# Patient Record
Sex: MALE | Race: WHITE | NOT HISPANIC OR LATINO | Employment: FULL TIME | URBAN - METROPOLITAN AREA
[De-identification: names, ages, dates, MRNs, and addresses within clinical notes are randomized per-mention and may not be internally consistent; named-entity substitution may affect disease eponyms.]

---

## 2020-04-29 ENCOUNTER — OFFICE VISIT (OUTPATIENT)
Dept: FAMILY MEDICINE | Facility: CLINIC | Age: 30
End: 2020-04-29
Payer: MEDICAID

## 2020-04-29 VITALS
OXYGEN SATURATION: 97 % | TEMPERATURE: 98 F | HEIGHT: 73 IN | SYSTOLIC BLOOD PRESSURE: 118 MMHG | HEART RATE: 89 BPM | WEIGHT: 239 LBS | BODY MASS INDEX: 31.68 KG/M2 | DIASTOLIC BLOOD PRESSURE: 70 MMHG

## 2020-04-29 DIAGNOSIS — F43.20 ADJUSTMENT DISORDER, UNSPECIFIED TYPE: Primary | ICD-10-CM

## 2020-04-29 DIAGNOSIS — Z00.00 WELLNESS EXAMINATION: ICD-10-CM

## 2020-04-29 DIAGNOSIS — F41.8 DEPRESSION WITH ANXIETY: ICD-10-CM

## 2020-04-29 DIAGNOSIS — Z23 NEED FOR DIPHTHERIA-TETANUS-PERTUSSIS (TDAP) VACCINE: ICD-10-CM

## 2020-04-29 DIAGNOSIS — Z86.59 HISTORY OF SUICIDAL IDEATION: ICD-10-CM

## 2020-04-29 PROCEDURE — 99204 OFFICE O/P NEW MOD 45 MIN: CPT | Mod: S$PBB,,, | Performed by: NURSE PRACTITIONER

## 2020-04-29 PROCEDURE — 99205 OFFICE O/P NEW HI 60 MIN: CPT | Performed by: NURSE PRACTITIONER

## 2020-04-29 PROCEDURE — 99204 PR OFFICE/OUTPT VISIT, NEW, LEVL IV, 45-59 MIN: ICD-10-PCS | Mod: S$PBB,,, | Performed by: NURSE PRACTITIONER

## 2020-04-29 PROCEDURE — 90471 IMMUNIZATION ADMIN: CPT | Mod: PBBFAC | Performed by: NURSE PRACTITIONER

## 2020-04-29 NOTE — PROGRESS NOTES
SUBJECTIVE:    Patient ID: Adam Nettles is a 30 y.o. male.    Chief Complaint: Establish Care and autism    Mr. Nettles is a 30-year-old gentleman here today to establish as a new patient.  History significant for ADHD and panic attacks.  He is well-versed in mental health terminology.  He lives with his father and his sister.  Reports some adjustment problems since his sister has moved back in.    He is here today with concern and recommendation from another provider that he be evaluated for autism spectrum disorder.  He reports that he had panic attack while in the , for reason he does not wish to discuss today, and was sent to [facility], treated and evaluated for anxiety/panic.  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  Reports that this was the time of his 1st suicide attempt, 'found his father's 45 revolver and tried to use it, however he could not figure out how to load it'.  He has had several attempts.  He does report suicidal ideation few months ago.  No recent.    Denies any chest pain, palpitations or dyspnea.  Denies dyspepsia, cough, hemoptysis or melena.  His only physical complaint is some dental decay.          Past Medical History:   Diagnosis Date    ADHD (attention deficit hyperactivity disorder)     Panic attacks 2015     History reviewed. No pertinent surgical history.  Family History   Problem Relation Age of Onset    Pancreatic cancer Mother     Cancer Mother     Stroke Father     No Known Problems Sister     No Known Problems Brother        Marital Status: Single  Alcohol History:  reports that he does not drink alcohol.  Tobacco History:  reports that he has never smoked. He has never used smokeless tobacco.  Drug History:  reports that he does not use drugs.    Review of patient's allergies indicates:  No Known  "Allergies  No current outpatient medications on file.    Review of Systems   HENT: Positive for dental problem.    Psychiatric/Behavioral: Positive for self-injury and suicidal ideas. The patient is nervous/anxious.    All other systems reviewed and are negative.         Objective:      Vitals:    04/29/20 1449   BP: 118/70   Pulse: 89   Temp: 97.9 °F (36.6 °C)   SpO2: 97%   Weight: 108.4 kg (239 lb)   Height: 6' 1" (1.854 m)     Body mass index is 31.53 kg/m².  Physical Exam   Constitutional: He is oriented to person, place, and time. He appears well-developed and well-nourished.   Obese   HENT:   Head: Normocephalic.   Eyes: Pupils are equal, round, and reactive to light. Conjunctivae and EOM are normal.   Neck: Normal range of motion. Neck supple. No thyromegaly present.   Cardiovascular: Normal rate, regular rhythm and normal heart sounds.   Pulmonary/Chest: Effort normal and breath sounds normal.   Abdominal: Soft. Bowel sounds are normal. He exhibits no distension.   Protuberant   Musculoskeletal: Normal range of motion.   Neurological: He is alert and oriented to person, place, and time.   Skin: Skin is warm and dry. Capillary refill takes less than 2 seconds.   Psychiatric: He has a normal mood and affect.   Nursing note and vitals reviewed.        Assessment:       1. Adjustment disorder, unspecified type    2. Depression with anxiety    3. Wellness examination    4. Need for diphtheria-tetanus-pertussis (Tdap) vaccine    5. History of suicidal ideation         Plan:       Adjustment disorder, unspecified type  -     Ambulatory referral/consult to Psychology; Future; Expected date: 05/06/2020    Depression with anxiety  -     Ambulatory referral/consult to Psychology; Future; Expected date: 05/06/2020    Wellness examination  -     CBC auto differential; Future; Expected date: 04/29/2020  -     Comprehensive metabolic panel; Future; Expected date: 04/29/2020  -     Lipid panel; Future; Expected date: " 04/29/2020  -     Urinalysis; Future; Expected date: 04/29/2020  -     TSH; Future; Expected date: 04/29/2020  -     T4, free; Future; Expected date: 04/29/2020  -     HIV 1/2 Ag/Ab (4th Gen); Future; Expected date: 04/29/2020    Need for diphtheria-tetanus-pertussis (Tdap) vaccine  -     Tdap Vaccine    History of suicidal ideation  -     Ambulatory referral/consult to Psychology; Future; Expected date: 05/06/2020      Follow up in about 2 months (around 6/29/2020), or if symptoms worsen or fail to improve.

## 2020-04-29 NOTE — PATIENT INSTRUCTIONS
Depression Affects Your Mind and Body  Everyone feels sad or blue from time to time for a few days or weeks. Depression is when these feelings don't go away and they interfere with daily life.  Depression is a real illness that can develop at any age. It is one of the most common mental health problems in the [place] Depression makes you feel sad, helpless, and hopeless. It gets in the way of your life and relationships. It inhibits your ability to think and act. But, with help, you can feel better again.     When I was depressed, I felt awful. I was so tired all the time I could hardly think, but at night I couldnt fall asleep. My head hurt. My stomach hurt. I didnt know what was wrong with me.   Depression affects your whole body  Brain chemicals affect your body as well as your mood. So depression may do more than just make you feel low. You may also feel bad physically. Depression can:  · Cause trouble with mental tasks such as remembering, concentrating, or making decisions  · Make you feel nervous and jumpy  · Cause trouble sleeping. Or you may sleep too much  · Change your appetite  · Cause headaches, stomachaches, or other aches and pains  · Drain your body of energy  Depression and other illness  It is common for people who have chronic health problems to also have depression. It can often be hard to tell which one caused the other. A person might become depressed after finding out they have a health problem. But some studies suggest being depressed may make certain health problems more likely. And some depressed people stop taking care of themselves. This may make them more likely to get sick.  Date Last Reviewed: 1/1/2017 © 2000-2017 [employer]. [address] [place] [address], [place] [address]. All rights reserved. This information is not intended as a substitute for professional medical care. Always follow your healthcare professional's instructions.        Understanding Anxiety  Disorders  Almost everyone gets nervous now and then. Its normal to have knots in your stomach before a test, or for your heart to race on a first date. But an anxiety disorder is much more than a case of nerves. In fact, its symptoms may be overwhelming. But treatment can relieve many of these symptoms. Talking to your healthcare provider is the first step.    What are anxiety disorders?  An anxiety disorder causes intense feelings of panic and fear. These feelings may arise for no apparent reason. And they tend to recur again and again. They may prevent you from coping with life and cause you great distress. As a result, you may avoid anything that triggers your fear. In extreme cases, you may never leave the house. Anxiety disorders may cause other symptoms, such as:  · Obsessive thoughts you cant control  · Constant nightmares or painful thoughts of the past  · Nausea, sweating, and muscle tension  · Trouble sleeping or concentrating  What causes anxiety disorders?  Anxiety disorders tend to run in families. For some people, childhood abuse or neglect may play a role. For others, stressful life events or trauma may trigger anxiety disorders. Anxiety can trigger low self-esteem and poor coping skills.  Common anxiety disorders  · Panic disorder. This causes an intense fear of being in danger.  · Phobias. These are extreme fears of certain objects, places, or events.  · Obsessive-compulsive disorder. This causes you to have unwanted thoughts and urges. You also may perform certain actions over and over.  · Posttraumatic stress disorder. This occurs in people who have survived a terrible ordeal. It can cause nightmares and flashbacks about the event.  · Generalized anxiety disorder. This causes constant worry that can greatly disrupt your life.   Getting better  You may believe that nothing can help you. Or, you might fear what others may think. But most anxiety symptoms can be eased. Having an anxiety disorder  is nothing to be ashamed of. Most people do best with treatment that combines medicine and therapy. These arent cures. But they can help you live a healthier life.  Date Last Reviewed: 2/1/2017  © 0971-8041 [employer]. [address] [place] [address], [place] [address]. All rights reserved. This information is not intended as a substitute for professional medical care. Always follow your healthcare professional's instructions.        Adjustment Disorder  Life changes -- work, family, parents, children -- each can cause a great deal of stress in life. An adjustment disorder means you have trouble dealing with this change and stress. This problem can have serious results. You may feel helpless, depressed, make bad decisions, or even feel like you want to hurt yourself.  Adjustment disorder can cause anxiety or depression. It is triggered by daily stresses such as:  · Death of a loved one  · Divorce  · Marriage  · General life changes such as changing or leaving a job  · Moving  · Illness or other health issue for you or a family  member  · Sex  · Money     Symptoms may include:  · Sadness, crying  · Anxiety  · Insomnia  · Poor concentration  · Trouble doing simple things  · New problems at work or with family or friends  · Loss of self-esteem  · Sense of hopelessness  · Feeling trapped or cut off from others  With this condition, it is common to feel sad, guilty, hopeless and restless. These feelings may continue for weeks or months. It can be helpful to identify what is causing the additional stress and take steps to get extra support. If new stressful events do not occur, it is likely that you will gradually start feeling better.  Home care  · If you have been given a prescription for medicine, take it as directed.  · It helps to talk about your feelings and thoughts with family or friends that understand and support you.  Follow-up care  Follow up with your healthcare provider, or therapist as advised. Let  them know if this condition does not improve or gets worse.  When to seek medical advice  Call your healthcare provider right away if any of these occur:  · Worsening depression or anxiety  · Feeling out of control  · Thoughts of harming yourself or another  · Being unable to care for yourself  Date Last Reviewed: 9/29/2015  © 2149-8998 [employer]. [address] [place] [address]. All rights reserved. This information is not intended as a substitute for professional medical care. Always follow your healthcare professional's instructions.      If you or someone you know is thinking about suicide, call the National Suicide Prevention Lifeline. 9-752-584-TALK (9952)

## 2020-05-02 ENCOUNTER — HOSPITAL ENCOUNTER (OUTPATIENT)
Facility: HOSPITAL | Age: 30
Discharge: HOME | End: 2020-05-04
Attending: EMERGENCY MEDICINE | Admitting: INTERNAL MEDICINE
Payer: MEDICAID

## 2020-05-02 DIAGNOSIS — R07.9 CHEST PAIN: ICD-10-CM

## 2020-05-02 DIAGNOSIS — R10.9 RIGHT FLANK PAIN: ICD-10-CM

## 2020-05-02 LAB
ALBUMIN SERPL BCP-MCNC: 4.3 G/DL (ref 3.5–5.2)
ALP SERPL-CCNC: 149 U/L (ref 55–135)
ALT SERPL W/O P-5'-P-CCNC: 78 U/L (ref 10–44)
AMPHET+METHAMPHET UR QL: NEGATIVE
ANION GAP SERPL CALC-SCNC: 9 MMOL/L (ref 8–16)
AST SERPL-CCNC: 51 U/L (ref 10–40)
BARBITURATES UR QL SCN>200 NG/ML: NEGATIVE
BASOPHILS # BLD AUTO: 0.05 K/UL (ref 0–0.2)
BASOPHILS NFR BLD: 0.4 % (ref 0–1.9)
BENZODIAZ UR QL SCN>200 NG/ML: NEGATIVE
BILIRUB SERPL-MCNC: 1.5 MG/DL (ref 0.1–1)
BILIRUB UR QL STRIP: NEGATIVE
BUN SERPL-MCNC: 12 MG/DL (ref 6–20)
BZE UR QL SCN: NEGATIVE
CALCIUM SERPL-MCNC: 9.1 MG/DL (ref 8.7–10.5)
CANNABINOIDS UR QL SCN: NEGATIVE
CHLORIDE SERPL-SCNC: 103 MMOL/L (ref 95–110)
CK SERPL-CCNC: 106 U/L (ref 20–200)
CLARITY UR: CLEAR
CO2 SERPL-SCNC: 24 MMOL/L (ref 23–29)
COLOR UR: YELLOW
CREAT SERPL-MCNC: 0.9 MG/DL (ref 0.5–1.4)
CREAT UR-MCNC: 362 MG/DL (ref 23–375)
CRP SERPL-MCNC: 6.44 MG/DL (ref 0–0.75)
DIFFERENTIAL METHOD: ABNORMAL
EOSINOPHIL # BLD AUTO: 0.2 K/UL (ref 0–0.5)
EOSINOPHIL NFR BLD: 2 % (ref 0–8)
ERYTHROCYTE [DISTWIDTH] IN BLOOD BY AUTOMATED COUNT: 12.9 % (ref 11.5–14.5)
EST. GFR  (AFRICAN AMERICAN): >60 ML/MIN/1.73 M^2
EST. GFR  (NON AFRICAN AMERICAN): >60 ML/MIN/1.73 M^2
GLUCOSE SERPL-MCNC: 98 MG/DL (ref 70–110)
GLUCOSE UR QL STRIP: NEGATIVE
HCT VFR BLD AUTO: 44.9 % (ref 40–54)
HGB BLD-MCNC: 14.9 G/DL (ref 14–18)
HGB UR QL STRIP: NEGATIVE
IMM GRANULOCYTES # BLD AUTO: 0.06 K/UL (ref 0–0.04)
IMM GRANULOCYTES NFR BLD AUTO: 0.5 % (ref 0–0.5)
KETONES UR QL STRIP: ABNORMAL
LACTATE SERPL-SCNC: 1 MMOL/L (ref 0.5–1.9)
LEUKOCYTE ESTERASE UR QL STRIP: NEGATIVE
LIPASE SERPL-CCNC: 21 U/L (ref 4–60)
LYMPHOCYTES # BLD AUTO: 3.4 K/UL (ref 1–4.8)
LYMPHOCYTES NFR BLD: 28.6 % (ref 18–48)
MCH RBC QN AUTO: 28.3 PG (ref 27–31)
MCHC RBC AUTO-ENTMCNC: 33.2 G/DL (ref 32–36)
MCV RBC AUTO: 85 FL (ref 82–98)
MONOCYTES # BLD AUTO: 1.5 K/UL (ref 0.3–1)
MONOCYTES NFR BLD: 12.5 % (ref 4–15)
NEUTROPHILS # BLD AUTO: 6.6 K/UL (ref 1.8–7.7)
NEUTROPHILS NFR BLD: 56 % (ref 38–73)
NITRITE UR QL STRIP: NEGATIVE
NRBC BLD-RTO: 0 /100 WBC
OPIATES UR QL SCN: NEGATIVE
PCP UR QL SCN>25 NG/ML: NEGATIVE
PH UR STRIP: 6 [PH] (ref 5–8)
PLATELET # BLD AUTO: 315 K/UL (ref 150–350)
PMV BLD AUTO: 10.4 FL (ref 9.2–12.9)
POTASSIUM SERPL-SCNC: 3.3 MMOL/L (ref 3.5–5.1)
PROCALCITONIN SERPL IA-MCNC: <0.05 NG/ML (ref 0–0.5)
PROT SERPL-MCNC: 8.4 G/DL (ref 6–8.4)
PROT UR QL STRIP: ABNORMAL
RBC # BLD AUTO: 5.27 M/UL (ref 4.6–6.2)
SARS-COV-2 RDRP RESP QL NAA+PROBE: NEGATIVE
SODIUM SERPL-SCNC: 136 MMOL/L (ref 136–145)
SP GR UR STRIP: 1.03 (ref 1–1.03)
TOXICOLOGY INFORMATION: NORMAL
URN SPEC COLLECT METH UR: ABNORMAL
UROBILINOGEN UR STRIP-ACNC: >=8 EU/DL
WBC # BLD AUTO: 11.75 K/UL (ref 3.9–12.7)

## 2020-05-02 PROCEDURE — 80053 COMPREHEN METABOLIC PANEL: CPT

## 2020-05-02 PROCEDURE — 96367 TX/PROPH/DG ADDL SEQ IV INF: CPT

## 2020-05-02 PROCEDURE — 99285 EMERGENCY DEPT VISIT HI MDM: CPT | Mod: 25

## 2020-05-02 PROCEDURE — 87040 BLOOD CULTURE FOR BACTERIA: CPT | Mod: 59

## 2020-05-02 PROCEDURE — 96365 THER/PROPH/DIAG IV INF INIT: CPT | Mod: 59

## 2020-05-02 PROCEDURE — 96375 TX/PRO/DX INJ NEW DRUG ADDON: CPT

## 2020-05-02 PROCEDURE — 96366 THER/PROPH/DIAG IV INF ADDON: CPT

## 2020-05-02 PROCEDURE — 83605 ASSAY OF LACTIC ACID: CPT

## 2020-05-02 PROCEDURE — 96361 HYDRATE IV INFUSION ADD-ON: CPT

## 2020-05-02 PROCEDURE — G0378 HOSPITAL OBSERVATION PER HR: HCPCS

## 2020-05-02 PROCEDURE — 25000003 PHARM REV CODE 250: Performed by: INTERNAL MEDICINE

## 2020-05-02 PROCEDURE — U0002 COVID-19 LAB TEST NON-CDC: HCPCS

## 2020-05-02 PROCEDURE — 36415 COLL VENOUS BLD VENIPUNCTURE: CPT

## 2020-05-02 PROCEDURE — 25500020 PHARM REV CODE 255: Performed by: EMERGENCY MEDICINE

## 2020-05-02 PROCEDURE — 86140 C-REACTIVE PROTEIN: CPT

## 2020-05-02 PROCEDURE — 84145 PROCALCITONIN (PCT): CPT

## 2020-05-02 PROCEDURE — 25000003 PHARM REV CODE 250: Performed by: NURSE PRACTITIONER

## 2020-05-02 PROCEDURE — 85025 COMPLETE CBC W/AUTO DIFF WBC: CPT

## 2020-05-02 PROCEDURE — 82550 ASSAY OF CK (CPK): CPT

## 2020-05-02 PROCEDURE — 63600175 PHARM REV CODE 636 W HCPCS: Performed by: INTERNAL MEDICINE

## 2020-05-02 PROCEDURE — 83690 ASSAY OF LIPASE: CPT

## 2020-05-02 PROCEDURE — 63600175 PHARM REV CODE 636 W HCPCS: Performed by: EMERGENCY MEDICINE

## 2020-05-02 PROCEDURE — 63600175 PHARM REV CODE 636 W HCPCS: Performed by: NURSE PRACTITIONER

## 2020-05-02 PROCEDURE — 80307 DRUG TEST PRSMV CHEM ANLYZR: CPT

## 2020-05-02 PROCEDURE — 81003 URINALYSIS AUTO W/O SCOPE: CPT | Mod: 59

## 2020-05-02 PROCEDURE — 96376 TX/PRO/DX INJ SAME DRUG ADON: CPT

## 2020-05-02 RX ORDER — POTASSIUM CHLORIDE 20 MEQ/1
40 TABLET, EXTENDED RELEASE ORAL
Status: COMPLETED | OUTPATIENT
Start: 2020-05-02 | End: 2020-05-02

## 2020-05-02 RX ORDER — SODIUM CHLORIDE 0.9 % (FLUSH) 0.9 %
3 SYRINGE (ML) INJECTION EVERY 6 HOURS PRN
Status: DISCONTINUED | OUTPATIENT
Start: 2020-05-02 | End: 2020-05-04 | Stop reason: HOSPADM

## 2020-05-02 RX ORDER — HYDROCODONE BITARTRATE AND ACETAMINOPHEN 5; 325 MG/1; MG/1
1 TABLET ORAL EVERY 6 HOURS PRN
Status: DISCONTINUED | OUTPATIENT
Start: 2020-05-02 | End: 2020-05-04 | Stop reason: HOSPADM

## 2020-05-02 RX ORDER — GLUCAGON 1 MG
1 KIT INJECTION
Status: DISCONTINUED | OUTPATIENT
Start: 2020-05-02 | End: 2020-05-04 | Stop reason: HOSPADM

## 2020-05-02 RX ORDER — IBUPROFEN 200 MG
16 TABLET ORAL
Status: DISCONTINUED | OUTPATIENT
Start: 2020-05-02 | End: 2020-05-04 | Stop reason: HOSPADM

## 2020-05-02 RX ORDER — TALC
9 POWDER (GRAM) TOPICAL NIGHTLY PRN
Status: DISCONTINUED | OUTPATIENT
Start: 2020-05-02 | End: 2020-05-04 | Stop reason: HOSPADM

## 2020-05-02 RX ORDER — VANCOMYCIN HCL IN 5 % DEXTROSE 1G/250ML
1000 PLASTIC BAG, INJECTION (ML) INTRAVENOUS ONCE
Status: DISCONTINUED | OUTPATIENT
Start: 2020-05-02 | End: 2020-05-02

## 2020-05-02 RX ORDER — ONDANSETRON 4 MG/1
8 TABLET, ORALLY DISINTEGRATING ORAL EVERY 8 HOURS PRN
Status: DISCONTINUED | OUTPATIENT
Start: 2020-05-02 | End: 2020-05-04 | Stop reason: HOSPADM

## 2020-05-02 RX ORDER — IBUPROFEN 200 MG
24 TABLET ORAL
Status: DISCONTINUED | OUTPATIENT
Start: 2020-05-02 | End: 2020-05-04 | Stop reason: HOSPADM

## 2020-05-02 RX ORDER — NAPROXEN 250 MG/1
500 TABLET ORAL ONCE
Status: COMPLETED | OUTPATIENT
Start: 2020-05-02 | End: 2020-05-02

## 2020-05-02 RX ORDER — KETOROLAC TROMETHAMINE 30 MG/ML
15 INJECTION, SOLUTION INTRAMUSCULAR; INTRAVENOUS
Status: COMPLETED | OUTPATIENT
Start: 2020-05-02 | End: 2020-05-02

## 2020-05-02 RX ORDER — ACETAMINOPHEN 325 MG/1
650 TABLET ORAL EVERY 4 HOURS PRN
Status: DISCONTINUED | OUTPATIENT
Start: 2020-05-02 | End: 2020-05-04 | Stop reason: HOSPADM

## 2020-05-02 RX ORDER — NAPROXEN 250 MG/1
500 TABLET ORAL
Status: DISCONTINUED | OUTPATIENT
Start: 2020-05-02 | End: 2020-05-02

## 2020-05-02 RX ORDER — POTASSIUM CHLORIDE 20 MEQ/1
40 TABLET, EXTENDED RELEASE ORAL ONCE
Status: DISCONTINUED | OUTPATIENT
Start: 2020-05-02 | End: 2020-05-04 | Stop reason: HOSPADM

## 2020-05-02 RX ADMIN — VANCOMYCIN HYDROCHLORIDE 2000 MG: 1 INJECTION, POWDER, LYOPHILIZED, FOR SOLUTION INTRAVENOUS at 06:05

## 2020-05-02 RX ADMIN — PIPERACILLIN AND TAZOBACTAM 3.38 G: 3; .375 INJECTION, POWDER, FOR SOLUTION INTRAVENOUS at 11:05

## 2020-05-02 RX ADMIN — IOHEXOL 100 ML: 350 INJECTION, SOLUTION INTRAVENOUS at 02:05

## 2020-05-02 RX ADMIN — PIPERACILLIN AND TAZOBACTAM 4.5 G: 4; .5 INJECTION, POWDER, LYOPHILIZED, FOR SOLUTION INTRAVENOUS; PARENTERAL at 01:05

## 2020-05-02 RX ADMIN — MELATONIN 9 MG: at 11:05

## 2020-05-02 RX ADMIN — SODIUM CHLORIDE 1000 ML: 0.9 INJECTION, SOLUTION INTRAVENOUS at 11:05

## 2020-05-02 RX ADMIN — KETOROLAC TROMETHAMINE 15 MG: 30 INJECTION, SOLUTION INTRAMUSCULAR; INTRAVENOUS at 11:05

## 2020-05-02 RX ADMIN — METHYLPREDNISOLONE SODIUM SUCCINATE 40 MG: 40 INJECTION, POWDER, FOR SOLUTION INTRAMUSCULAR; INTRAVENOUS at 06:05

## 2020-05-02 RX ADMIN — POTASSIUM CHLORIDE 40 MEQ: 20 TABLET, EXTENDED RELEASE ORAL at 12:05

## 2020-05-02 RX ADMIN — HYDROCODONE BITARTRATE AND ACETAMINOPHEN 1 TABLET: 5; 325 TABLET ORAL at 11:05

## 2020-05-02 RX ADMIN — Medication: at 09:05

## 2020-05-02 RX ADMIN — NAPROXEN 500 MG: 250 TABLET ORAL at 06:05

## 2020-05-02 NOTE — PROGRESS NOTES
VANCOMYCIN PHARMACOKINETIC NOTE:  Vancomycin Day # 1    Objective/Assessment:    Diagnosis/Indication for Vancomycin: Bacteremia     30 y.o., male; Actual Body Weight = 117.9 kg (259 lb 14.8 oz).    The patient has the following labs:  5/2/2020 Estimated Creatinine Clearance: 162.9 mL/min (based on SCr of 0.9 mg/dL). Lab Results   Component Value Date    BUN 12 05/02/2020       Lab Results   Component Value Date    WBC 11.75 05/02/2020              Plan:  Adjust vancomycin dose and/or frequency based on the patient's actual weight and renal function:  Initiate Vancomycin 2000 mg IV every q12 hours.  Orders have been entered into patient's chart.    Vancomycin dose = 16.9 mg/kg actual body weight    Vancomycin trough level has been ordered for prior to 4th dose.    Pharmacy will manage vancomycin therapy, monitor serum vancomycin levels, monitor renal function and adjust regimen as necessary.      Thank you for allowing us to participate in this patient's care.     Taiwo Zuniga 5/2/2020 5:54 PM  Department of Pharmacy  Ext [phone]

## 2020-05-02 NOTE — ED PROVIDER NOTES
Encounter Date: 5/2/2020       History     Chief Complaint   Patient presents with    Flank Pain     X 3 DAYS     Presents with complaint of right flank pain  Onset 3 days ago  Denies injury or picking up anything heavy.  Pt denies history of kidney stones.  Denies NV or urinary symptoms  Pt reports not taking any medication for the pain not even OTC's         Review of patient's allergies indicates:  No Known Allergies  Past Medical History:   Diagnosis Date    ADHD     ADHD (attention deficit hyperactivity disorder)     Panic attacks 2015     History reviewed. No pertinent surgical history.  Family History   Problem Relation Age of Onset    Pancreatic cancer Mother     Cancer Mother     Stroke Father     No Known Problems Sister     No Known Problems Brother      Social History     Tobacco Use    Smoking status: Never Smoker    Smokeless tobacco: Never Used   Substance Use Topics    Alcohol use: Never     Frequency: Never    Drug use: Never     Review of Systems   Constitutional: Negative for fever.   Respiratory: Negative for cough, shortness of breath and wheezing.    Cardiovascular: Negative for chest pain, palpitations and leg swelling.   Gastrointestinal: Negative for abdominal pain, diarrhea, nausea and vomiting.   Genitourinary: Positive for flank pain. Negative for decreased urine volume, difficulty urinating, dysuria, frequency, hematuria, penile pain, scrotal swelling, testicular pain and urgency.   Musculoskeletal: Negative for back pain.   Skin: Negative for rash.   Neurological: Negative for weakness.       Physical Exam     Initial Vitals [05/02/20 1037]   BP Pulse Resp Temp SpO2   133/77 (!) 116 18 98.5 °F (36.9 °C) 96 %      MAP       --         Physical Exam    Constitutional: He appears well-developed and well-nourished.   HENT:   Mouth/Throat: Oropharynx is clear and moist.   Eyes: Conjunctivae are normal.   Neck: Normal range of motion. Neck supple.   Cardiovascular: Normal rate.    Pulmonary/Chest: Breath sounds normal.   Abdominal: Soft. Bowel sounds are normal. There is tenderness. There is no rebound.   Suprapubic pain with palpation   Musculoskeletal: Normal range of motion.   Neurological: He is alert and oriented to person, place, and time. No sensory deficit. GCS score is 15. GCS eye subscore is 4. GCS verbal subscore is 5. GCS motor subscore is 6.   Skin: Skin is warm. Capillary refill takes less than 2 seconds.   Psychiatric: He has a normal mood and affect. Thought content normal.         ED Course   Procedures  Labs Reviewed   CBC W/ AUTO DIFFERENTIAL   COMPREHENSIVE METABOLIC PANEL   URINALYSIS, REFLEX TO URINE CULTURE          Imaging Results    None          Medical Decision Making:   Initial Assessment:   Right flank pain on and off for 3 days   Dr. Stone in to examine pt                                   Clinical Impression:     1. Right flank pain                      Gissell Payne NP  05/02/20 [id number]

## 2020-05-03 LAB
ALBUMIN SERPL BCP-MCNC: 4 G/DL (ref 3.5–5.2)
ALP SERPL-CCNC: 143 U/L (ref 55–135)
ALT SERPL W/O P-5'-P-CCNC: 84 U/L (ref 10–44)
ANION GAP SERPL CALC-SCNC: 8 MMOL/L (ref 8–16)
AST SERPL-CCNC: 56 U/L (ref 10–40)
BASOPHILS # BLD AUTO: 0.02 K/UL (ref 0–0.2)
BASOPHILS NFR BLD: 0.2 % (ref 0–1.9)
BILIRUB SERPL-MCNC: 1.1 MG/DL (ref 0.1–1)
BUN SERPL-MCNC: 14 MG/DL (ref 6–20)
CALCIUM SERPL-MCNC: 9.3 MG/DL (ref 8.7–10.5)
CHLORIDE SERPL-SCNC: 105 MMOL/L (ref 95–110)
CHOLEST SERPL-MCNC: 240 MG/DL (ref 120–199)
CHOLEST/HDLC SERPL: 5.5 {RATIO} (ref 2–5)
CO2 SERPL-SCNC: 23 MMOL/L (ref 23–29)
CREAT SERPL-MCNC: 0.9 MG/DL (ref 0.5–1.4)
DIFFERENTIAL METHOD: ABNORMAL
EOSINOPHIL # BLD AUTO: 0 K/UL (ref 0–0.5)
EOSINOPHIL NFR BLD: 0.1 % (ref 0–8)
ERYTHROCYTE [DISTWIDTH] IN BLOOD BY AUTOMATED COUNT: 12.8 % (ref 11.5–14.5)
ERYTHROCYTE [SEDIMENTATION RATE] IN BLOOD BY WESTERGREN METHOD: 23 MM/HR (ref 0–10)
EST. GFR  (AFRICAN AMERICAN): >60 ML/MIN/1.73 M^2
EST. GFR  (NON AFRICAN AMERICAN): >60 ML/MIN/1.73 M^2
ESTIMATED AVG GLUCOSE: 108 MG/DL (ref 68–131)
GLUCOSE SERPL-MCNC: 146 MG/DL (ref 70–110)
HBA1C MFR BLD HPLC: 5.4 % (ref 4.5–6.2)
HCT VFR BLD AUTO: 40.7 % (ref 40–54)
HDLC SERPL-MCNC: 44 MG/DL (ref 40–75)
HDLC SERPL: 18.3 % (ref 20–50)
HGB BLD-MCNC: 13.3 G/DL (ref 14–18)
IMM GRANULOCYTES # BLD AUTO: 0.07 K/UL (ref 0–0.04)
IMM GRANULOCYTES NFR BLD AUTO: 0.6 % (ref 0–0.5)
LDLC SERPL CALC-MCNC: 180.8 MG/DL (ref 63–159)
LYMPHOCYTES # BLD AUTO: 1.4 K/UL (ref 1–4.8)
LYMPHOCYTES NFR BLD: 11.7 % (ref 18–48)
MAGNESIUM SERPL-MCNC: 2.2 MG/DL (ref 1.6–2.6)
MCH RBC QN AUTO: 28.1 PG (ref 27–31)
MCHC RBC AUTO-ENTMCNC: 32.7 G/DL (ref 32–36)
MCV RBC AUTO: 86 FL (ref 82–98)
MONOCYTES # BLD AUTO: 0.7 K/UL (ref 0.3–1)
MONOCYTES NFR BLD: 5.6 % (ref 4–15)
NEUTROPHILS # BLD AUTO: 9.8 K/UL (ref 1.8–7.7)
NEUTROPHILS NFR BLD: 81.8 % (ref 38–73)
NONHDLC SERPL-MCNC: 196 MG/DL
NRBC BLD-RTO: 0 /100 WBC
PLATELET # BLD AUTO: 299 K/UL (ref 150–350)
PMV BLD AUTO: 11.2 FL (ref 9.2–12.9)
POTASSIUM SERPL-SCNC: 4.2 MMOL/L (ref 3.5–5.1)
PROT SERPL-MCNC: 7.7 G/DL (ref 6–8.4)
RBC # BLD AUTO: 4.73 M/UL (ref 4.6–6.2)
SODIUM SERPL-SCNC: 136 MMOL/L (ref 136–145)
TRIGL SERPL-MCNC: 76 MG/DL (ref 30–150)
WBC # BLD AUTO: 12 K/UL (ref 3.9–12.7)

## 2020-05-03 PROCEDURE — 80074 ACUTE HEPATITIS PANEL: CPT

## 2020-05-03 PROCEDURE — 83036 HEMOGLOBIN GLYCOSYLATED A1C: CPT

## 2020-05-03 PROCEDURE — 85025 COMPLETE CBC W/AUTO DIFF WBC: CPT

## 2020-05-03 PROCEDURE — 63600175 PHARM REV CODE 636 W HCPCS: Performed by: INTERNAL MEDICINE

## 2020-05-03 PROCEDURE — 83735 ASSAY OF MAGNESIUM: CPT

## 2020-05-03 PROCEDURE — 36415 COLL VENOUS BLD VENIPUNCTURE: CPT

## 2020-05-03 PROCEDURE — 96376 TX/PRO/DX INJ SAME DRUG ADON: CPT

## 2020-05-03 PROCEDURE — 96366 THER/PROPH/DIAG IV INF ADDON: CPT

## 2020-05-03 PROCEDURE — G0378 HOSPITAL OBSERVATION PER HR: HCPCS

## 2020-05-03 PROCEDURE — 25000003 PHARM REV CODE 250: Performed by: INTERNAL MEDICINE

## 2020-05-03 PROCEDURE — 80061 LIPID PANEL: CPT

## 2020-05-03 PROCEDURE — 85651 RBC SED RATE NONAUTOMATED: CPT

## 2020-05-03 PROCEDURE — 80053 COMPREHEN METABOLIC PANEL: CPT

## 2020-05-03 RX ORDER — NAPROXEN 250 MG/1
500 TABLET ORAL ONCE
Status: COMPLETED | OUTPATIENT
Start: 2020-05-03 | End: 2020-05-03

## 2020-05-03 RX ADMIN — METHYLPREDNISOLONE SODIUM SUCCINATE 40 MG: 40 INJECTION, POWDER, FOR SOLUTION INTRAMUSCULAR; INTRAVENOUS at 09:05

## 2020-05-03 RX ADMIN — NAPROXEN 500 MG: 250 TABLET ORAL at 09:05

## 2020-05-03 RX ADMIN — VANCOMYCIN HYDROCHLORIDE 2000 MG: 1 INJECTION, POWDER, LYOPHILIZED, FOR SOLUTION INTRAVENOUS at 06:05

## 2020-05-03 RX ADMIN — PIPERACILLIN AND TAZOBACTAM 3.38 G: 3; .375 INJECTION, POWDER, FOR SOLUTION INTRAVENOUS at 09:05

## 2020-05-03 RX ADMIN — PIPERACILLIN AND TAZOBACTAM 3.38 G: 3; .375 INJECTION, POWDER, FOR SOLUTION INTRAVENOUS at 10:05

## 2020-05-03 NOTE — NURSING
Pt states he only has pain with movement on his right side.  Pt states deep breathing also increases pain due to movement.  Pt states he has new rash to left wrist and right thumb, which is swollen and white in the center and red around it.  Message to doctor regarding rash.

## 2020-05-03 NOTE — PLAN OF CARE
Problem: Adult Inpatient Plan of Care  Goal: Plan of Care Review  Outcome: Ongoing, Progressing  Goal: Absence of Hospital-Acquired Illness or Injury  Outcome: Ongoing, Progressing  Goal: Optimal Comfort and Wellbeing  Outcome: Ongoing, Progressing

## 2020-05-03 NOTE — NURSING
Benadryl cream applied to new rash.  Pt instructed to call for worsening rash/itching.  Pt denies shortness of breath.  Pt voiced understanding.

## 2020-05-03 NOTE — PROGRESS NOTES
"[facility] Medicine Progress Note  Patient Name: Adam Nettles MRN: [id number]   Patient Class: OP- Observation  Length of Stay: 0   Admission Date: 5/2/2020 10:34 AM Attending Physician: Kumar Rodriguez MD   Primary Care Provider: Kylie Breaux NP Face-to-Face encounter date: 05/03/2020   Chief Complaint: Flank Pain (X 3 DAYS)    Assessment & Plan:   Adam Nettles is a 30 y.o. male admitted for    1. Right flank pain: significantly improved. Continue antibiotics. Awaiting final culture results. Continue Naproxen and steroids.       Discharge Planning:   anticipate discharge tomorrow    Subjective:    Interval History: Patient is doing fairly well. Pain has almost gone. No family present at bedside.No concerns/issues overnight reported by the patient or the nursing staff.    Review of Systems All other Review of Systems were found to be negative expect for that mentioned already in HPI.   Objective:   Physical Exam  BP (!) 96/57 (BP Location: Left arm, Patient Position: Lying)   Pulse 96   Temp 97.9 °F (36.6 °C) (Oral)   Resp 16   Ht 6' 1" (1.854 m)   Wt 117.9 kg (259 lb 14.8 oz)   SpO2 96%   BMI 34.29 kg/m²   Vitals reviewed.    Constitutional: No distress.   HENT: Atraumatic.   Cardiovascular: Normal rate, regular rhythm and normal heart sounds.   Pulmonary/Chest: Effort normal. Clear to auscultation bilaterally. No wheezes.   Abdominal: Soft. Bowel sounds are normal. Exhibits no distension and no mass. No tenderness  Neurological: Alert.   Skin: Skin is warm and dry.     Following labs were Reviewed   Recent Labs   Lab 05/03/20  0514   WBC 12.00   HGB 13.3*   HCT 40.7      CALCIUM 9.3   ALBUMIN 4.0   PROT 7.7      K 4.2   CO2 23      BUN 14   CREATININE 0.9   ALKPHOS 143*   ALT 84*   AST 56*   BILITOT 1.1*     No results found for: POCTGLUCOSE     All labs within the past 24 hours have been reviewed  Microbiology Results (last 7 days)     Procedure Component " Value Units Date/Time    Blood culture [[id number]] Collected:  05/02/20 1425    Order Status:  Completed Specimen:  Blood from Peripheral, Antecubital, Left Updated:  05/03/20 0117     Blood Culture, Routine No Growth to date    Blood culture [[id number]] Collected:  05/02/20 1440    Order Status:  Completed Specimen:  Blood from Peripheral, Antecubital, Left Updated:  05/03/20 0117     Blood Culture, Routine No Growth to date        CT Abdomen Pelvis With Contrast   Final Result      US Abdomen Limited   Final Result      Mildly echogenic liver may reflect hepatic steatosis or diffuse liver disease.         Electronically signed by: Watson Briggs MD   Date:    05/02/2020   Time:    13:04      CT Renal Stone Study ABD Pelvis WO   Final Result      1. No hydronephrosis, nephrolithiasis or other acute urinary system abnormality.   2. There is right pararenal fascial thickening, and fascial thickening and fat stranding in the right pericolic gutter.  The right kidney, ascending colon and appendix are all radiographically unremarkable.  Findings likely reflect an inflammatory reaction without identifiable source radiographically.         Electronically signed by: Watson Briggs MD   Date:    05/02/2020   Time:    11:46          Inpatient medications  Scheduled Meds:   piperacillin-tazobactam (ZOSYN) IVPB  3.375 g Intravenous Q8H    potassium chloride  40 mEq Oral Once    vancomycin (VANCOCIN) IVPB  2,000 mg Intravenous Q12H     Continuous Infusions:  PRN Meds:.acetaminophen, dextrose 50%, dextrose 50%, diphenhydrAMINE-zinc acetate 1-0.1%, glucagon (human recombinant), glucose, glucose, HYDROcodone-acetaminophen, melatonin, ondansetron, sodium chloride 0.9%, Pharmacy to dose Vancomycin consult **AND** vancomycin - pharmacy to dose    Above encounter included review of the medical records, interviewing and examining the patient face-to-face, discussion with family and other health care providers, ordering and  interpreting lab/test results and formulating a plan of care.     Medical Decision Making:    [] Low Complexity  [x] Moderate Complexity  [] High Complexity    Kumar Rodriguez  [facility] Hospitalist  05/03/2020

## 2020-05-04 VITALS
BODY MASS INDEX: 34.45 KG/M2 | RESPIRATION RATE: 18 BRPM | TEMPERATURE: 99 F | OXYGEN SATURATION: 96 % | HEIGHT: 73 IN | WEIGHT: 259.94 LBS | HEART RATE: 89 BPM | DIASTOLIC BLOOD PRESSURE: 73 MMHG | SYSTOLIC BLOOD PRESSURE: 122 MMHG

## 2020-05-04 LAB
ANION GAP SERPL CALC-SCNC: 8 MMOL/L (ref 8–16)
BASOPHILS # BLD AUTO: 0.03 K/UL (ref 0–0.2)
BASOPHILS NFR BLD: 0.2 % (ref 0–1.9)
BUN SERPL-MCNC: 10 MG/DL (ref 6–20)
CALCIUM SERPL-MCNC: 8.9 MG/DL (ref 8.7–10.5)
CHLORIDE SERPL-SCNC: 107 MMOL/L (ref 95–110)
CO2 SERPL-SCNC: 23 MMOL/L (ref 23–29)
CREAT SERPL-MCNC: 0.9 MG/DL (ref 0.5–1.4)
DIFFERENTIAL METHOD: ABNORMAL
EOSINOPHIL # BLD AUTO: 0.2 K/UL (ref 0–0.5)
EOSINOPHIL NFR BLD: 1.4 % (ref 0–8)
ERYTHROCYTE [DISTWIDTH] IN BLOOD BY AUTOMATED COUNT: 13 % (ref 11.5–14.5)
EST. GFR  (AFRICAN AMERICAN): >60 ML/MIN/1.73 M^2
EST. GFR  (NON AFRICAN AMERICAN): >60 ML/MIN/1.73 M^2
GLUCOSE SERPL-MCNC: 114 MG/DL (ref 70–110)
HCT VFR BLD AUTO: 38 % (ref 40–54)
HGB BLD-MCNC: 12.5 G/DL (ref 14–18)
IMM GRANULOCYTES # BLD AUTO: 0.08 K/UL (ref 0–0.04)
IMM GRANULOCYTES NFR BLD AUTO: 0.7 % (ref 0–0.5)
LYMPHOCYTES # BLD AUTO: 2.4 K/UL (ref 1–4.8)
LYMPHOCYTES NFR BLD: 19.4 % (ref 18–48)
MAGNESIUM SERPL-MCNC: 2.1 MG/DL (ref 1.6–2.6)
MCH RBC QN AUTO: 28.2 PG (ref 27–31)
MCHC RBC AUTO-ENTMCNC: 32.9 G/DL (ref 32–36)
MCV RBC AUTO: 86 FL (ref 82–98)
MONOCYTES # BLD AUTO: 1.2 K/UL (ref 0.3–1)
MONOCYTES NFR BLD: 10 % (ref 4–15)
NEUTROPHILS # BLD AUTO: 8.3 K/UL (ref 1.8–7.7)
NEUTROPHILS NFR BLD: 68.3 % (ref 38–73)
NRBC BLD-RTO: 0 /100 WBC
PLATELET # BLD AUTO: 288 K/UL (ref 150–350)
PMV BLD AUTO: 10.8 FL (ref 9.2–12.9)
POTASSIUM SERPL-SCNC: 3.6 MMOL/L (ref 3.5–5.1)
RBC # BLD AUTO: 4.44 M/UL (ref 4.6–6.2)
SODIUM SERPL-SCNC: 138 MMOL/L (ref 136–145)
VANCOMYCIN TROUGH SERPL-MCNC: 12.6 UG/ML (ref 10–22)
WBC # BLD AUTO: 12.14 K/UL (ref 3.9–12.7)

## 2020-05-04 PROCEDURE — 25000003 PHARM REV CODE 250: Performed by: INTERNAL MEDICINE

## 2020-05-04 PROCEDURE — 96376 TX/PRO/DX INJ SAME DRUG ADON: CPT

## 2020-05-04 PROCEDURE — 80048 BASIC METABOLIC PNL TOTAL CA: CPT

## 2020-05-04 PROCEDURE — 83735 ASSAY OF MAGNESIUM: CPT

## 2020-05-04 PROCEDURE — 36415 COLL VENOUS BLD VENIPUNCTURE: CPT

## 2020-05-04 PROCEDURE — 80202 ASSAY OF VANCOMYCIN: CPT

## 2020-05-04 PROCEDURE — G0378 HOSPITAL OBSERVATION PER HR: HCPCS

## 2020-05-04 PROCEDURE — 85025 COMPLETE CBC W/AUTO DIFF WBC: CPT

## 2020-05-04 PROCEDURE — 63600175 PHARM REV CODE 636 W HCPCS: Performed by: INTERNAL MEDICINE

## 2020-05-04 RX ORDER — LEVOFLOXACIN 500 MG/1
500 TABLET, FILM COATED ORAL DAILY
Status: DISCONTINUED | OUTPATIENT
Start: 2020-05-04 | End: 2020-05-04 | Stop reason: HOSPADM

## 2020-05-04 RX ORDER — LEVOFLOXACIN 500 MG/1
500 TABLET, FILM COATED ORAL DAILY
Qty: 5 TABLET | Refills: 0 | Status: SHIPPED | OUTPATIENT
Start: 2020-05-04 | End: 2020-07-08

## 2020-05-04 RX ADMIN — PIPERACILLIN AND TAZOBACTAM 3.38 G: 3; .375 INJECTION, POWDER, FOR SOLUTION INTRAVENOUS at 04:05

## 2020-05-04 RX ADMIN — MELATONIN 9 MG: at 12:05

## 2020-05-04 RX ADMIN — ACETAMINOPHEN 650 MG: 325 TABLET ORAL at 12:05

## 2020-05-04 NOTE — DISCHARGE SUMMARY
"[facility]  Discharge Summary  Patient Name: Adam Nettles MRN: [id number]   Patient Class: OP- Observation  Length of Stay: 0   Admission Date: 5/2/2020 10:34 AM Attending Physician: Kumar Rodriguez MD   Primary Care Provider: Kylie Breaux NP Face-to-Face encounter date: 05/04/2020   Chief Complaint: Flank Pain (X 3 DAYS)    Date of Discharge: 5/4/2020  Discharge Disposition: Home  Condition: Stable    Reason for Hospitalization   Primary Diagnosis: Right Costochondral musculoskeletal pain    Secondary Diagnosis:       Patient Active Problem List   Diagnosis    Right flank pain       Brief History of Present Illness    Adam Nettles is a 30 y.o. male who  has a past medical history of ADHD, ADHD (attention deficit hyperactivity disorder), and Panic attacks (2015).. The patient presented to [facility] on 5/2/2020 with a primary complaint of Flank Pain (X 3 DAYS)  .     For the full HPI please refer to the History & Physical from this admission.    Hospital Course By Problem with Pertinent Findings     This is a 30 year old male admitted for right flank pain. He had CT scan done that showed inflammatory/edematous change of soft tissues in the right flank vs  infection, fat necrosis, and atypical epiploi appendagitis. He was treated with broad spectrum antibiotics along with steroids and NSAIDs to reduce the inflammation. Patient significantly improved. Cultures remained negative so antibiotics were deescalated to Levofloxacin to cover any urinary or abdominal infection (low suspicion). His pain was attributed to muscular pain and advised to take NSAIDS as needed for the pain. He was in stable condition at the time of discharge.       Physical Exam  /73 (BP Location: Left arm, Patient Position: Lying)   Pulse 89   Temp 98.6 °F (37 °C) (Oral)   Resp 18   Ht 6' 1" (1.854 m)   Wt 117.9 kg (259 lb 14.8 oz)   SpO2 96%   BMI 34.29 kg/m²   Vitals reviewed.    Constitutional: " No distress.   HENT: Atraumatic.   Cardiovascular: Normal rate, regular rhythm and normal heart sounds.   Pulmonary/Chest: Effort normal. Clear to auscultation bilaterally. No wheezes.   Abdominal: Soft. Bowel sounds are normal. Exhibits no distension and no mass. No tenderness  Neurological: Alert.   Skin: Skin is warm and dry.     Following labs were Reviewed   Recent Labs   Lab 05/04/20  0517   WBC 12.14   HGB 12.5*   HCT 38.0*      CALCIUM 8.9      K 3.6   CO2 23      BUN 10   CREATININE 0.9     No results found for: POCTGLUCOSE     All labs within the past 24 hours have been reviewed    Microbiology Results (last 7 days)     Procedure Component Value Units Date/Time    Blood culture [[id number]] Collected:  05/02/20 1440    Order Status:  Completed Specimen:  Blood from Peripheral, Antecubital, Left Updated:  05/03/20 1832     Blood Culture, Routine No Growth to date      No Growth to date    Blood culture [[id number]] Collected:  05/02/20 1425    Order Status:  Completed Specimen:  Blood from Peripheral, Antecubital, Left Updated:  05/03/20 1832     Blood Culture, Routine No Growth to date      No Growth to date        CT Abdomen Pelvis With Contrast   Final Result      US Abdomen Limited   Final Result      Mildly echogenic liver may reflect hepatic steatosis or diffuse liver disease.         Electronically signed by: Watson Briggs MD   Date:    05/02/2020   Time:    13:04      CT Renal Stone Study ABD Pelvis WO   Final Result      1. No hydronephrosis, nephrolithiasis or other acute urinary system abnormality.   2. There is right pararenal fascial thickening, and fascial thickening and fat stranding in the right pericolic gutter.  The right kidney, ascending colon and appendix are all radiographically unremarkable.  Findings likely reflect an inflammatory reaction without identifiable source radiographically.         Electronically signed by: Watson Briggs MD   Date:    05/02/2020    Time:    11:46          No results found for this or any previous visit.      Consultants and Procedures   Consultants:  None    Procedures:   None    Discharge Information:   Diet:  Resume regular diet    Physical Activity:  Activity as tolerated    Instructions:  1. Take all medications as prescribed  2. Keep all follow-up appointments  3. Return to the hospital or call your primary care physicians if any worsening symptoms such as chest pain, abdominal pain, occur.      Follow-Up Appointments:  1. Please call your primary care physician to schedule an appointment in 1 week time.    Pending laboratory work/Tests to be performed/followed by the Primary care Physician:  None    The patient was discharged in the care of her parents//wife/family/caregiver, with discharge instructions were reviewed in written and verbal form. All pertinent questions were discussed and prescriptions were provided. The importance of making follow up appointments and compliance of medications has been stressed repeatedly. The patient will follow up in 1 week or sooner as needed with the PCP, and the patient is on board with the plan. Upon discharge, patient needs to be on following medications.    Discharge Medications:     Medication List      START taking these medications    levoFLOXacin 500 MG tablet  Commonly known as:  LEVAQUIN  Take 1 tablet (500 mg total) by mouth once daily.           Where to Get Your Medications      These medications were sent to [id number] - [address] & [address] [address]    Hours:  24-hours Phone:  [phone]   · levoFLOXacin 500 MG tablet           I spent 30 minutes preparing the discharge including reviewing records from previous encounters, preparation of discharge summary, assessing and final examination of the patient, discharge medicine reconciliation, discussing plan of care, follow up and education and  prescriptions.       Kumar Rodriguez  [facility] Hospitalist  05/04/2020

## 2020-05-04 NOTE — PLAN OF CARE
05/04/20 1109   Final Note   Assessment Type Final Discharge Note   Anticipated Discharge Disposition Home

## 2020-05-04 NOTE — DISCHARGE INSTRUCTIONS
Diet:  Resume regular diet    Physical Activity:  Activity as tolerated    Instructions:  1. Take all medications as prescribed  2. Keep all follow-up appointments  3. Return to the hospital or call your primary care physicians if any worsening symptoms such as chest pain, abdominal pain, occur.      Follow-Up Appointments:  1. Please call your primary care physician to schedule an appointment in 1 week time.    Pending laboratory work/Tests to be performed/followed by the Primary care Physician:  None    The patient was discharged in the care of her parents//wife/family/caregiver, with discharge instructions were reviewed in written and verbal form. All pertinent questions were discussed and prescriptions were provided. The importance of making follow up appointments and compliance of medications has been stressed repeatedly. The patient will follow up in 1 week or sooner as needed with the PCP, and the patient is on board with the plan. Upon discharge, patient needs to be on following medications.

## 2020-05-04 NOTE — PROGRESS NOTES
Pharmacokinetic Assessment Follow Up: IV Vancomycin    Vancomycin serum concentration assessment(s):    The trough level was drawn correctly and can be used to guide therapy at this time. The measurement is below the desired definitive target range of 15 to 20 mcg/mL.    Vancomycin Regimen Plan:    Change regimen to Vancomycin 2000 mg IV every 10 hours with next serum trough concentration measured at 1300 prior to 4th dose on 05/05/20     Drug levels (last 3 results):  Recent Labs   Lab Result Units 05/04/20  0517   Vancomycin-Trough ug/mL 12.6       Pharmacy will continue to follow and monitor vancomycin.    Please contact pharmacy at extension [phone] for questions regarding this assessment.    Thank you for the consult,   Sherwin King       Patient brief summary:  Adam Nettles is a 30 y.o. male initiated on antimicrobial therapy with IV Vancomycin for treatment of bacteremia    The patient's current regimen is Vancomycin 2000 mg IV Q12H    Drug Allergies:   Review of patient's allergies indicates:  No Known Allergies    Actual Body Weight:   117.9 kg    Renal Function:   Estimated Creatinine Clearance: 162.9 mL/min (based on SCr of 0.9 mg/dL).,     CBC (last 72 hours):  Recent Labs   Lab Result Units 05/02/20  1050 05/03/20  0514 05/04/20  0517   WBC K/uL 11.75 12.00 12.14   Hemoglobin g/dL 14.9 13.3* 12.5*   Hemoglobin A1C %  --  5.4  --    Hematocrit % 44.9 40.7 38.0*   Platelets K/uL 315 299 288   Gran% % 56.0 81.8* 68.3   Lymph% % 28.6 11.7* 19.4   Mono% % 12.5 5.6 10.0   Eosinophil% % 2.0 0.1 1.4   Basophil% % 0.4 0.2 0.2   Differential Method  Automated Automated Automated       Metabolic Panel (last 72 hours):  Recent Labs   Lab Result Units 05/02/20  0945 05/02/20  1045 05/02/20  1050 05/03/20  0514 05/04/20  0517   Sodium mmol/L  --   --  136 136 138   Potassium mmol/L  --   --  3.3* 4.2 3.6   Chloride mmol/L  --   --  103 105 107   CO2 mmol/L  --   --  24 23 23   Glucose mg/dL  --   --  98 146* 114*    Glucose, UA   --  Negative  --   --   --    BUN, Bld mg/dL  --   --  12 14 10   Creatinine mg/dL  --   --  0.9 0.9 0.9   Creatinine, Random Ur mg/dL 362.0  --   --   --   --    Albumin g/dL  --   --  4.3 4.0  --    Total Bilirubin mg/dL  --   --  1.5* 1.1*  --    Alkaline Phosphatase U/L  --   --  149* 143*  --    AST U/L  --   --  51* 56*  --    ALT U/L  --   --  78* 84*  --    Magnesium mg/dL  --   --   --  2.2 2.1       Vancomycin Administrations:  vancomycin given in the last 96 hours                     vancomycin (VANCOCIN) 2,000 mg in dextrose 5 % 500 mL IVPB (mg) 2,000 mg New Bag 05/03/20 1839     2,000 mg New Bag  0631     2,000 mg New Bag 05/02/20 1818                      Microbiologic Results:  Microbiology Results (last 7 days)       Procedure Component Value Units Date/Time    Blood culture [[id number]] Collected:  05/02/20 1440    Order Status:  Completed Specimen:  Blood from Peripheral, Antecubital, Left Updated:  05/03/20 1832     Blood Culture, Routine No Growth to date      No Growth to date    Blood culture [[id number]] Collected:  05/02/20 1425    Order Status:  Completed Specimen:  Blood from Peripheral, Antecubital, Left Updated:  05/03/20 1832     Blood Culture, Routine No Growth to date      No Growth to date

## 2020-05-05 LAB
HAV IGM SERPL QL IA: NEGATIVE
HBV CORE IGM SERPL QL IA: NEGATIVE
HBV SURFACE AG SERPL QL IA: NEGATIVE
HCV AB S/CO SERPL IA: <0.1 S/CO RATIO (ref 0–0.9)

## 2020-05-07 LAB
BACTERIA BLD CULT: NORMAL
BACTERIA BLD CULT: NORMAL

## 2020-07-08 ENCOUNTER — OFFICE VISIT (OUTPATIENT)
Dept: FAMILY MEDICINE | Facility: CLINIC | Age: 30
End: 2020-07-08
Payer: MEDICAID

## 2020-07-08 VITALS
TEMPERATURE: 98 F | WEIGHT: 239 LBS | SYSTOLIC BLOOD PRESSURE: 120 MMHG | OXYGEN SATURATION: 97 % | DIASTOLIC BLOOD PRESSURE: 80 MMHG | HEIGHT: 73 IN | BODY MASS INDEX: 31.68 KG/M2 | HEART RATE: 91 BPM

## 2020-07-08 DIAGNOSIS — K76.0 FATTY LIVER: ICD-10-CM

## 2020-07-08 DIAGNOSIS — F90.9 ATTENTION DEFICIT HYPERACTIVITY DISORDER (ADHD), UNSPECIFIED ADHD TYPE: ICD-10-CM

## 2020-07-08 DIAGNOSIS — F41.0 PANIC ATTACKS: ICD-10-CM

## 2020-07-08 DIAGNOSIS — Z09 HOSPITAL DISCHARGE FOLLOW-UP: Primary | ICD-10-CM

## 2020-07-08 DIAGNOSIS — E78.5 DYSLIPIDEMIA: ICD-10-CM

## 2020-07-08 DIAGNOSIS — R53.83 FATIGUE, UNSPECIFIED TYPE: ICD-10-CM

## 2020-07-08 PROCEDURE — 99214 OFFICE O/P EST MOD 30 MIN: CPT | Mod: S$PBB,,, | Performed by: NURSE PRACTITIONER

## 2020-07-08 PROCEDURE — 99214 PR OFFICE/OUTPT VISIT, EST, LEVL IV, 30-39 MIN: ICD-10-PCS | Mod: S$PBB,,, | Performed by: NURSE PRACTITIONER

## 2020-07-08 PROCEDURE — 99214 OFFICE O/P EST MOD 30 MIN: CPT | Performed by: NURSE PRACTITIONER

## 2020-07-08 NOTE — PATIENT INSTRUCTIONS
Potassium-Rich Foods  The normal adult diet usually contains 2,000 mg to 4,000 mg of potassium per day. More potassium is needed when you lose too much potassium from your body. This can happen if you have diarrhea or vomiting. It can also happen if you take a medicine to make you urinate more (diuretic). To increase the amount of potassium in your diet, include these high-potassium foods.     [The (*) indicates foods highest in potassium.]  Vegetables  Artichokes. Cooked 1/2 cup, 200 mg to 300 mg*  Asparagus. Cooked 1/2 cup, 200 mg to 300 mg  Beans. White, red, bowers cooked 1/2 cup, 300 mg to 500 mg*  Beets. Cooked 1/2 cup, 200 mg to 300 mg  Broccoli. Cooked or raw 1 cup, 200 mg to 500 mg*  [place] sprouts. Cooked 1/2 cup, 200 mg to 300 mg  Cabbage. Raw 1 cup, 100 mg to 200 mg  Carrots. Raw or cooked 1/2 cup, 100 mg to 200 mg  Celery. Raw 1 cup, 200 mg to 300 mg  Lima beans. Fresh or frozen 1/2 cup, 300 mg to 500 mg*   Mushrooms. Raw or cooked 1/2 cup, 100 mg to 300 mg  Peas. Cooked 1/2 cup, 150 mg to 250 mg   Potatoes. Baked 1 medium, 500 mg to 900 mg*   Spinach. Cooked 1 cup, 800 mg to 900 mg*   Spinach. Raw 2 cups, 300 mg to 400 mg *  Squash, winter. Fresh, frozen, or cooked 1/2 cup, 200 mg to 400 mg   Tomato. Fresh 1 medium, 200 mg to 300 mg   Tomato juice. Canned 1/2 cup, 200 mg to 300 mg   Fruits  Apple juice. Unsweetened 1 cup, 200 mg to 300 mg   Apricots. Canned 1/2 cup, 200 mg to 300 mg   Apricots. Dried 4 pieces, 100 mg to 200 mg   Avocado. Raw 1/2 cup, 300 mg to 400 mg*  Banana. Fresh 1 small, 300 mg to 400 mg*   Cantaloupe. Fresh 1 cup diced, 300 mg to 400 mg*   Grape juice. Unsweetened 1 cup, 200 mg to 300 mg   Honeydew melon. Fresh 1 cup diced, 300 mg to 400 mg*   Orange. Fresh 1 medium, 200 mg to 300 mg    Orange juice. Unsweetened, fresh or frozen 1/2 cup, 200 mg to 300 mg  Pineapple juice. Unsweetened 1 cup, 300 mg to 400 mg   Prune juice. Unsweetened 1/2 cup, 300 mg to 400 mg*   Prunes. Dried 5  pieces, 300 mg to 400 mg*   Strawberries. Fresh or frozen 1 cup, 200 mg to 300 mg  Meat  Red meat. Cooked 3 ounces, 100 mg to 300 mg   Seafood  Cod, flounder, halibut. Cooked 3 ounces, 100 mg to 300 mg*  [place]. Cooked, 3 ounces 300 mg to 400 mg*   Scallops. Cooked 3 ounces, 200 mg to 300 mg*  Shrimp. Cooked 3/4 cup, 100 mg to 200 mg   Tuna. Fresh or canned 3/4 cup, 200 mg to 500 mg   Date Last Reviewed: 10/1/2016  © 3892-4917 [employer]. [address] [place] [address], [place] [address]. All rights reserved. This information is not intended as a substitute for professional medical care. Always follow your healthcare professional's instructions.        Low-Salt Diet  This diet removes foods that are high in salt. It also limits the amount of salt you use when cooking. It is most often used for people with high blood pressure, edema (fluid retention), and kidney, liver, or heart disease.  Table salt contains the mineral sodium. Your body needs sodium to work normally. But too much sodium can make your health problems worse. Your healthcare provider is recommending a low-salt (also called low-sodium) diet for you. Your total daily allowance of salt is 1,500 to 2,300 milligrams (mg). It is less than 1 teaspoon of table salt. This means you can have only about 500 to 700 mg of sodium at each meal. People with certain health problems should limit salt intake to the lower end of the recommended range.    When you cook, dont add much salt. If you can cook without using salt, even better. Dont add salt to your food at the table.  When shopping, read food labels. Salt is often called sodium on the label. Choose foods that are salt-free, low salt, or very low salt. Note that foods with reduced salt may not lower your salt intake enough.    Beans, potatoes, and pasta  Ok: Dry beans, split peas, lentils, potatoes, rice, macaroni, pasta, spaghetti without added salt  Avoid: Potato chips, tortilla chips, and similar  products  Breads and cereals  Ok: Low-sodium breads, rolls, cereals, and cakes; low-salt crackers, matzo crackers  Avoid: Salted crackers, pretzels, popcorn, [place]an toast, pancakes, muffins  Dairy  Ok: Milk, chocolate milk, hot chocolate mix, low-salt cheeses, and yogurt  Avoid: Processed cheese and cheese spreads; Roquefort, Camembert, and cottage cheese; buttermilk, instant breakfast drink  Desserts  Ok: Ice cream, frozen yogurt, juice bars, gelatin, cookies and pies, sugar, honey, jelly, hard candy  Avoid: Most pies, cakes and cookies prepared or processed with salt; instant pudding  Drinks  Ok: Tea, coffee, fizzy (carbonated) drinks, juices  Avoid: Flavored coffees, electrolyte replacement drinks, sports drinks  Meats  Ok: All fresh meat, fish, poultry, low-salt tuna, eggs, egg substitute  Avoid: Smoked, pickled, brine-cured, or salted meats and fish. This includes mckeon, chipped beef, corned beef, hot dogs, deli meats, ham, kosher meats, salt pork, sausage, canned tuna, salted codfish, smoked salmon, herring, sardines, or anchovies.  Seasonings and spices  Ok: Most seasonings are okay. Good substitutes for salt include: fresh herb blends, hot sauce, lemon, garlic, juarez, vinegar, dry mustard, parsley, cilantro, horseradish, tomato paste, regular margarine, mayonnaise, unsalted butter, cream cheese, vegetable oil, cream, low-salt salad dressing and gravy.  Avoid: Regular ketchup, relishes, pickles, soy sauce, teriyaki sauce, Worcestershire sauce, BBQ sauce, tartar sauce, meat tenderizer, chili sauce, regular gravy, regular salad dressing, salted butter  Soups  Ok: Low-salt soups and broths made with allowed foods  Avoid: Bouillon cubes, soups with smoked or salted meats, regular soup and broth  Vegetables  Ok: Most vegetables are okay; also low-salt tomato and vegetable juices  Avoid: Sauerkraut and other brine-soaked vegetables; pickles and other pickled vegetables; tomato juice, olives  Date Last Reviewed:  8/1/2016  © 9060-6444 [employer]. [address] [place] [address], [place] [address]. All rights reserved. This information is not intended as a substitute for professional medical care. Always follow your healthcare professional's instructions.        Eating Heart-Healthy Foods  Eating has a big impact on your heart health. In fact, eating healthier can improve several of your heart risks at once. For instance, it helps you manage weight, cholesterol, and blood pressure. Here are ideas to help you make heart-healthy changes without giving up all the foods and flavors you love.  Getting started  · Talk with your health care provider about eating plans, such as the DASH or Mediterranean diet. You may also be referred to a dietitian.  · Change a few things at a time. Give yourself time to get used to a few eating changes before adding more.  · Work to create a tasty, healthy eating plan that you can stick to for the rest of your life.    Goals for healthy eating  Below are some tips to improve your eating habits:  · Limit saturated fats and trans fats. Saturated fats raise your levels of cholesterol, so keep these fats to a minimum. They are found in foods such as fatty meats, whole milk, cheese, and palm and coconut oils. Avoid trans fats because they lower good cholesterol as well as raise bad cholesterol. Trans fats are most often found in processed foods.  · Reduce sodium (salt) intake. Eating too much salt may increase your blood pressure. Limit your sodium intake to 2,300 milligrams (mg) per day, or less if your health care provider recommends it. Dining out less often and eating fewer processed foods are two great ways to decrease the amount of salt you consume.  · Managing calories. A calorie is a unit of energy. Your body burns calories for fuel, but if you eat more calories than your body burns, the extras are stored as fat. Your health care provider can help you create a diet plan to manage your  calories. This will likely include eating healthier foods as well as exercising regularly. To help you track your progress, keep a diary to record what you eat and how often you exercise.  Choose the right foods  Aim to make these foods staples of your diet. If you have diabetes, you may have different recommendations than what is listed here:  · Fruits and vegetable provide plenty of nutrients without a lot of calories. At meals, fill half your plate with these foods. Split the other half of your plate between whole grains and lean protein.  · Whole grains are high in fiber and rich in vitamins and nutrients. Good choices include whole-wheat bread, pasta, and brown rice.  · Lean proteins give you nutrition with less fat. Good choices include fish, skinless chicken, and beans.  · Low-fat or nonfat dairy provides nutrients without a lot of fat. Try low-fat or nonfat milk, cheese, or yogurt.  · Healthy fats can be good for you in small amounts. These are unsaturated fats, such as olive oil, nuts, and fish. Try to have at least 2 servings per week of fatty fish such as salmon, sardines, mackerel, rainbow trout, and albacore tuna. These contain omega-3 fatty acids, which are good for your heart. Flaxseed is another source of a heart-healthy fat.  More on heart healthy eating    Read food labels  Healthy eating starts at the grocery store. Be sure to pay attention to food labels on packaged foods. Look for products that are high in fiber and protein, and low in saturated fat, cholesterol, and sodium. Avoid products that contain trans fat. And pay close attention to serving size. For instance, if you plan to eat two servings, double all the numbers on the label.  Prepare food right  A key part of healthy cooking is cutting down on added fat and salt. Look on the internet for lower-fat, lower-sodium recipes. Also, try these tips:  · Remove fat from meat and skin from poultry before cooking.  · Skim fat from the surface of  soups and sauces.  · Broil, boil, bake, steam, grill, and microwave food without added fats.  · Choose ingredients that spice up your food without adding calories, fat, or sodium. Try these items: horseradish, hot sauce, lemon, mustard, nonfat salad dressings, and vinegar. For salt-free herbs and spices, try basil, cilantro, cinnamon, pepper, and rosemary.  Date Last Reviewed: 6/25/2015  © 2218-1643 [employer]. [address] [place] [address]. All rights reserved. This information is not intended as a substitute for professional medical care. Always follow your healthcare professional's instructions.

## 2020-07-08 NOTE — PROGRESS NOTES
SUBJECTIVE:    Patient ID: Adam Nettles is a 30 y.o. male.    Chief Complaint: Anxiety and Depression    Mr. Nettles is a 30-year-old gentleman here for f/u depression and anxiety (improved) and f/u ED visit 5/2 for sudden onset right flank pain, tx'd with course of abx for complete resolution of symptoms.  History significant for ADHD and panic attacks.  He is well-versed in mental health terminology.  He lives with his father and his sister.  Reports some adjustment problems since his sister has moved back in.  He has established with psych and is being seen weekly.     He would still like to be evaluated for autism spectrum disorder, however has not found a provider to do this.  He reports that he had panic attack while in the , for reason he does not wish to discuss today, and 'was sent to [facility]', treated and evaluated for anxiety/panic.  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  Reports that this was the time of his 1st suicide attempt, 'found his father's 45 revolver and tried to use it, however he could not figure out how to load it'.  He has had several attempts.  He does report suicidal ideation few months ago.  No recent.     Denies any chest pain, palpitations or dyspnea.  Denies dyspepsia, cough, hemoptysis or melena.      5/2/2020 Hospital Course By Problem with Pertinent Findings     This is a 30 year old male admitted for right flank pain. He had CT scan done that showed inflammatory/edematous change of soft tissues in the right flank vs  infection, fat necrosis, and atypical epiploi appendagitis. He was treated with broad spectrum antibiotics along with steroids and NSAIDs to reduce the inflammation. Patient significantly improved. Cultures remained negative so antibiotics were deescalated to Levofloxacin to cover any urinary  or abdominal infection (low suspicion). His pain was attributed to muscular pain and advised to take NSAIDS as needed for the pain. He was in stable condition at the time of discharge.   Kumar Rodriguez  [facility] Hospitalist  05/04/2020    Narrative & Impression    CMS MANDATED QUALITY DATA - CT RADIATION 436. CT scans at this  facility utilize dose modulation, iterative reconstruction, and/or  weight based dosing when appropriate to reduce radiation dose to as  low as reasonably achievable.     PROCEDURE:    CT ABDOMEN PELVIS WITH CONTRAST  dated  5/2/2020 2:19 PM     CLINICAL HISTORY:   Male 30 years of age.   Sudden onset right flank  pain./Stranding of fat within the right side of the abdomen on  noncontrast CT abdomen/pelvis performed earlier in the day.     TECHNIQUE:  CT of the Abdomen and Pelvis was performed after the  intravenous administration of contrast.     COMPARISON:  Noncontrast CT abdomen/pelvis performed 3 hours earlier.     FINDINGS:     Right hemidiaphragm is elevated. There is subsegmental  atelectasis/scarring of the overlying right lower lobe.     The liver, gallbladder, pancreas, spleen, adrenal glands and kidneys  are normal.     Bowel is not dilated or thickened. The appendix is normal.     There is increased attenuation of fat posterior to the right colon,,  thickening of the adjacent posterior and lateral peritoneum, lateral  conal fascia and anterior and posterior pararenal fascia. Stranding of  fat in the right paracolic gutter. Intercostal muscles are mildly  thicker than those on the left, between the posterior lateral right  ribs 10, 11 and 12. All these findings are unchanged compared with the  CT 3 hours earlier.     Urinary bladder is mildly filled. Prostate gland and seminal vesicles  are normal. Aorta is normal. There is no lymphadenopathy.     There is no fracture or dislocation. There is no osteolytic or  osteoblastic lesion.        IMPRESSION:     The inflammatory/edematous  change of soft tissues in the right flank  is unchanged compared with the study 3 hours earlier. The findings  suggest trauma to the flank. Without a history of trauma, differential  and includes infection, fat necrosis, and atypical epiploic  appendagitis.     Electronically Signed by Mar Epperson on 5/2/2020 3:24 PM           CT Abdomen Pelvis With Contrast  Final Result     US Abdomen Limited  Final Result     Mildly echogenic liver may reflect hepatic steatosis or diffuse liver disease.        Electronically signed by:            Watson Briggs MD  Date:                                                            05/02/2020  Time:                                                            13:04     CT Renal Stone Study ABD Pelvis WO  Final Result     1. No hydronephrosis, nephrolithiasis or other acute urinary system abnormality.  2. There is right pararenal fascial thickening, and fascial thickening and fat stranding in the right pericolic gutter.  The right kidney, ascending colon and appendix are all radiographically unremarkable.  Findings likely reflect an inflammatory reaction without identifiable source radiographically.        Electronically signed by:            Watson Briggs MD  Date:                                                            05/02/2020  Time:                                                            11:46          No results found for this or any previous visit.         Anxiety  Patient reports no chest pain, dizziness, nausea, nervous/anxious behavior, shortness of breath or suicidal ideas.       DepressionPatient is not experiencing: nervousness/anxiety, shortness of breath and suicidal ideas.      No visits with results within 2 Month(s) from this visit.   Latest known visit with results is:   Admission on 05/02/2020, Discharged on 05/04/2020   Component Date Value Ref Range Status    WBC 05/02/2020 11.75  3.90 - 12.70 K/uL Final    RBC 05/02/2020 5.27  4.60 - 6.20 M/uL Final     Hemoglobin 05/02/2020 14.9  14.0 - 18.0 g/dL Final    Hematocrit 05/02/2020 44.9  40.0 - 54.0 % Final    Mean Corpuscular Volume 05/02/2020 85  82 - 98 fL Final    Mean Corpuscular Hemoglobin 05/02/2020 28.3  27.0 - 31.0 pg Final    Mean Corpuscular Hemoglobin Conc 05/02/2020 33.2  32.0 - 36.0 g/dL Final    RDW 05/02/2020 12.9  11.5 - 14.5 % Final    Platelets 05/02/2020 315  150 - 350 K/uL Final    MPV 05/02/2020 10.4  9.2 - 12.9 fL Final    Immature Granulocytes 05/02/2020 0.5  0.0 - 0.5 % Final    Gran # (ANC) 05/02/2020 6.6  1.8 - 7.7 K/uL Final    Immature Grans (Abs) 05/02/2020 0.06* 0.00 - 0.04 K/uL Final    Comment: Mild elevation in immature granulocytes is non specific and   can be seen in a variety of conditions including stress response,   acute inflammation, trauma and pregnancy. Correlation with other   laboratory and clinical findings is essential.      Lymph # 05/02/2020 3.4  1.0 - 4.8 K/uL Final    Mono # 05/02/2020 1.5* 0.3 - 1.0 K/uL Final    Eos # 05/02/2020 0.2  0.0 - 0.5 K/uL Final    Baso # 05/02/2020 0.05  0.00 - 0.20 K/uL Final    nRBC 05/02/2020 0  0 /100 WBC Final    Gran% 05/02/2020 56.0  38.0 - 73.0 % Final    Lymph% 05/02/2020 28.6  18.0 - 48.0 % Final    Mono% 05/02/2020 12.5  4.0 - 15.0 % Final    Eosinophil% 05/02/2020 2.0  0.0 - 8.0 % Final    Basophil% 05/02/2020 0.4  0.0 - 1.9 % Final    Differential Method 05/02/2020 Automated   Final    Sodium 05/02/2020 136  136 - 145 mmol/L Final    Potassium 05/02/2020 3.3* 3.5 - 5.1 mmol/L Final    Chloride 05/02/2020 103  95 - 110 mmol/L Final    CO2 05/02/2020 24  23 - 29 mmol/L Final    Glucose 05/02/2020 98  70 - 110 mg/dL Final    BUN, Bld 05/02/2020 12  6 - 20 mg/dL Final    Creatinine 05/02/2020 0.9  0.5 - 1.4 mg/dL Final    Calcium 05/02/2020 9.1  8.7 - 10.5 mg/dL Final    Total Protein 05/02/2020 8.4  6.0 - 8.4 g/dL Final    Albumin 05/02/2020 4.3  3.5 - 5.2 g/dL Final    Total Bilirubin  05/02/2020 1.5* 0.1 - 1.0 mg/dL Final    Comment: For infants and newborns, interpretation of results should be based  on gestational age, weight and in agreement with clinical  observations.  Premature Infant recommended reference ranges:  Up to 24 hours.............<8.0 mg/dL  Up to 48 hours............<12.0 mg/dL  3-5 days..................<15.0 mg/dL  6-29 days.................<15.0 mg/dL      Alkaline Phosphatase 05/02/2020 149* 55 - 135 U/L Final    AST 05/02/2020 51* 10 - 40 U/L Final    ALT 05/02/2020 78* 10 - 44 U/L Final    Anion Gap 05/02/2020 9  8 - 16 mmol/L Final    eGFR if African American 05/02/2020 >60.0  >60 mL/min/1.73 m^2 Final    eGFR if non African American 05/02/2020 >60.0  >60 mL/min/1.73 m^2 Final    Comment: Calculation used to obtain the estimated glomerular filtration  rate (eGFR) is the CKD-EPI equation.       Specimen UA 05/02/2020 Urine, Clean Catch   Final    Color, UA 05/02/2020 Yellow  Yellow, Straw, Li Final    Appearance, UA 05/02/2020 Clear  Clear Final    pH, UA 05/02/2020 6.0  5.0 - 8.0 Final    Specific Gravity, UA 05/02/2020 1.030  1.005 - 1.030 Final    Protein, UA 05/02/2020 Trace* Negative Final    Comment: Recommend a 24 hour urine protein or a urine   protein/creatinine ratio if globulin induced proteinuria is  clinically suspected.      Glucose, UA 05/02/2020 Negative  Negative Final    Ketones, UA 05/02/2020 Trace* Negative Final    Bilirubin (UA) 05/02/2020 Negative  Negative Final    Occult Blood UA 05/02/2020 Negative  Negative Final    Nitrite, UA 05/02/2020 Negative  Negative Final    Urobilinogen, UA 05/02/2020 >=8.0* Negative EU/dL Final    Leukocytes, UA 05/02/2020 Negative  Negative Final    Lipase 05/02/2020 21  4 - 60 U/L Final    Lactate (Lactic Acid) 05/02/2020 1.0  0.5 - 1.9 mmol/L Final    Comment: Falsely low lactic acid results can be found in samples   containing >=13.0 mg/dL total bilirubin and/or >=3.5 mg/dL   direct  bilirubin.      Blood Culture, Routine 05/02/2020 No growth after 5 days.   Final    Blood Culture, Routine 05/02/2020 No growth after 5 days.   Final    CPK 05/02/2020 106  20 - 200 U/L Final    SARS-CoV-2 RNA, Amplification, Qual 05/02/2020 Negative  Negative Final    Comment: This test utilizes isothermal nucleic acid amplification   technology to detect the SARS-CoV-2 RdRp nucleic acid segment.   The analytical sensitivity (limit of detection) is 125 genome   equivalents/mL.   A POSITIVE result implies infection with the SARS-CoV-2 virus;  the patient is presumed to be contagious.    A NEGATIVE result means that SARS-CoV-2 nucleic acids are not  present above the limit of detection. It does not rule out the   possibility of COVID-19 and should not be the sole basis for   treatment decisions. If COVID-19 is strongly suspected based on  clinical and exposure history, re-testing should be considered.   This test is only for use under the Food and Drug   Administration s Emergency Use Authorization (EUA).   Commercial kits are provided by [employer].   Performance characteristics of the EUA have been independently  verified by [facility] Department of  Pathology and Laboratory Medicine.   ____________________________________________________                           _____________  The ID NOW COVID-19 Letter of Authorization, along with the   authorized Fact Sheet for Healthcare Providers, the authorized Fact  Sheet for Patients, and authorized labeling are available on the FDA   website:  www.fda.gov/MedicalDevices/Safety/EmergencySituations/aju837473.htm      Procalcitonin 05/02/2020 <0.05  0.00 - 0.50 ng/mL Final    Comment: Procalcitonin Result Interpretation:  <=0.50 ng/mL  Systemic infection (sepsis) is not likely. Local bacterial infection   is   possible.  >0.50 and <= 2.00 ng/mL  Systemic infection (sepsis) is possible, but other conditions are   also known   to elevate  procalcitonin.   >2.00 ng/mL  Systemic infection (sepsis) is likely unless other causes are known.  >=10.00 ng/mL  Important systemic inflammatory response, almost exclusively due to   severe   bacterial sepsis or septic shock.      Benzodiazepines 05/02/2020 Negative   Final    Cocaine (Metab.) 05/02/2020 Negative   Final    Opiate Scrn, Ur 05/02/2020 Negative   Final    Barbiturate Screen, Ur 05/02/2020 Negative   Final    Amphetamine Screen, Ur 05/02/2020 Negative   Final    THC 05/02/2020 Negative   Final    Phencyclidine 05/02/2020 Negative   Final    Creatinine, Random Ur 05/02/2020 362.0  23.0 - 375.0 mg/dL Final    Comment: The random urine reference ranges provided were established   for 24 hour urine collections.  No reference ranges exist for  random urine specimens.  Correlate clinically.      Toxicology Information 05/02/2020 SEE COMMENT   Final    Comment: This screen includes the following classes of drugs at the   listed cut-off:  Benzodiazepines                  200 ng/ml  Cocaine metabolite               300 ng/ml  Opiates                          300 ng/ml  Barbiturates                     200 ng/ml  Amphetamines                    1000 ng/ml  Marijuana metabs (THC)            50 ng/ml  Phencyclidine (PCP)               25 ng/ml  High concentrations of Methylenedioxymethamphetamine (MDMA aka  Ectasy) and other structurally similar compounds may cross-   react with the Amphetamine/Methamphetamine screening   immunoassay giving a false positive result.  Note: This exception list includes only more common   interferants in toxicology screen testing.  Because of many   cross-reactantspositive results on toxicology drug screens   should be confirmed whenever results do not correlate with   clinical presentation.  This report is intended for use in clinical monitoring and  management of patients. It is not intended for use in   employment                            related drug  testing.  Because of any cross-reactants, positive results on toxicology  drug screens should be confirmed whenever results do not  correlate with clinical presentation.  Presumptive positive results are unconfirmed and may be used   only for medical purposes.      CRP 05/02/2020 6.44* 0.00 - 0.75 mg/dL Final    Sed Rate 05/03/2020 23* 0 - 10 mm/Hr Final    Sodium 05/03/2020 136  136 - 145 mmol/L Final    Potassium 05/03/2020 4.2  3.5 - 5.1 mmol/L Final    Chloride 05/03/2020 105  95 - 110 mmol/L Final    CO2 05/03/2020 23  23 - 29 mmol/L Final    Glucose 05/03/2020 146* 70 - 110 mg/dL Final    BUN, Bld 05/03/2020 14  6 - 20 mg/dL Final    Creatinine 05/03/2020 0.9  0.5 - 1.4 mg/dL Final    Calcium 05/03/2020 9.3  8.7 - 10.5 mg/dL Final    Total Protein 05/03/2020 7.7  6.0 - 8.4 g/dL Final    Albumin 05/03/2020 4.0  3.5 - 5.2 g/dL Final    Total Bilirubin 05/03/2020 1.1* 0.1 - 1.0 mg/dL Final    Comment: For infants and newborns, interpretation of results should be based  on gestational age, weight and in agreement with clinical  observations.  Premature Infant recommended reference ranges:  Up to 24 hours.............<8.0 mg/dL  Up to 48 hours............<12.0 mg/dL  3-5 days..................<15.0 mg/dL  6-29 days.................<15.0 mg/dL      Alkaline Phosphatase 05/03/2020 143* 55 - 135 U/L Final    AST 05/03/2020 56* 10 - 40 U/L Final    ALT 05/03/2020 84* 10 - 44 U/L Final    Anion Gap 05/03/2020 8  8 - 16 mmol/L Final    eGFR if African American 05/03/2020 >60.0  >60 mL/min/1.73 m^2 Final    eGFR if non African American 05/03/2020 >60.0  >60 mL/min/1.73 m^2 Final    Comment: Calculation used to obtain the estimated glomerular filtration  rate (eGFR) is the CKD-EPI equation.       Magnesium 05/03/2020 2.2  1.6 - 2.6 mg/dL Final    Cholesterol 05/03/2020 240* 120 - 199 mg/dL Final    Comment: The National Cholesterol Education Program (NCEP) has set the  following guidelines (reference  ranges) for Cholesterol:  Optimal.....................<200 mg/dL  Borderline High.............200-239 mg/dL  High........................> or = 240 mg/dL      Triglycerides 05/03/2020 76  30 - 150 mg/dL Final    Comment: The National Cholesterol Education Program (NCEP) has set the  following guidelines (reference values) for triglycerides:  Normal......................<150 mg/dL  Borderline High.............150-199 mg/dL  High........................200-499 mg/dL      HDL 05/03/2020 44  40 - 75 mg/dL Final    Comment: The National Cholesterol Education Program (NCEP) has set the  following guidelines (reference values) for HDL Cholesterol:  Low...............<40 mg/dL  Optimal...........>60 mg/dL      LDL Cholesterol 05/03/2020 180.8* 63.0 - 159.0 mg/dL Final    Comment: The National Cholesterol Education Program (NCEP) has set the  following guidelines (reference values) for LDL Cholesterol:  Optimal.......................<130 mg/dL  Borderline High...............130-159 mg/dL  High..........................160-189 mg/dL  Very High.....................>190 mg/dL      Hdl/Cholesterol Ratio 05/03/2020 18.3* 20.0 - 50.0 % Final    Total Cholesterol/HDL Ratio 05/03/2020 5.5* 2.0 - 5.0 Final    Non-HDL Cholesterol 05/03/2020 196  mg/dL Final    Comment: Risk category and Non-HDL cholesterol goals:  Coronary heart disease (CHD)or equivalent (10-year risk of CHD >20%):  Non-HDL cholesterol goal     <130 mg/dL  Two or more CHD risk factors and 10-year risk of CHD <= 20%:  Non-HDL cholesterol goal     <160 mg/dL  0 to 1 CHD risk factor:  Non-HDL cholesterol goal     <190 mg/dL      Hemoglobin A1C 05/03/2020 5.4  4.5 - 6.2 % Final    Comment: According to ADA guidelines, hemoglobin A1C <7.0% represents  optimal control in non-pregnant diabetic patients.  Different  metrics may apply to specific populations.   Standards of Medical Care in Diabetes - 2016.  For the purpose of screening for the presence of  diabetes:  <5.7%     Consistent with the absence of diabetes  5.7-6.4%  Consistent with increasing risk for diabetes   (prediabetes)  >or=6.5%  Consistent with diabetes  Currently no consensus exists for use of hemoglobin A1C  for diagnosis of diabetes for children.      Estimated Avg Glucose 05/03/2020 108  68 - 131 mg/dL Final    WBC 05/03/2020 12.00  3.90 - 12.70 K/uL Final    RBC 05/03/2020 4.73  4.60 - 6.20 M/uL Final    Hemoglobin 05/03/2020 13.3* 14.0 - 18.0 g/dL Final    Hematocrit 05/03/2020 40.7  40.0 - 54.0 % Final    Mean Corpuscular Volume 05/03/2020 86  82 - 98 fL Final    Mean Corpuscular Hemoglobin 05/03/2020 28.1  27.0 - 31.0 pg Final    Mean Corpuscular Hemoglobin Conc 05/03/2020 32.7  32.0 - 36.0 g/dL Final    RDW 05/03/2020 12.8  11.5 - 14.5 % Final    Platelets 05/03/2020 299  150 - 350 K/uL Final    MPV 05/03/2020 11.2  9.2 - 12.9 fL Final    Immature Granulocytes 05/03/2020 0.6* 0.0 - 0.5 % Final    Gran # (ANC) 05/03/2020 9.8* 1.8 - 7.7 K/uL Final    Immature Grans (Abs) 05/03/2020 0.07* 0.00 - 0.04 K/uL Final    Comment: Mild elevation in immature granulocytes is non specific and   can be seen in a variety of conditions including stress response,   acute inflammation, trauma and pregnancy. Correlation with other   laboratory and clinical findings is essential.      Lymph # 05/03/2020 1.4  1.0 - 4.8 K/uL Final    Mono # 05/03/2020 0.7  0.3 - 1.0 K/uL Final    Eos # 05/03/2020 0.0  0.0 - 0.5 K/uL Final    Baso # 05/03/2020 0.02  0.00 - 0.20 K/uL Final    nRBC 05/03/2020 0  0 /100 WBC Final    Gran% 05/03/2020 81.8* 38.0 - 73.0 % Final    Lymph% 05/03/2020 11.7* 18.0 - 48.0 % Final    Mono% 05/03/2020 5.6  4.0 - 15.0 % Final    Eosinophil% 05/03/2020 0.1  0.0 - 8.0 % Final    Basophil% 05/03/2020 0.2  0.0 - 1.9 % Final    Differential Method 05/03/2020 Automated   Final    Hep A IgM 05/03/2020 Negative  Negative Final    Hepatitis B Surface Ag 05/03/2020 Negative   Negative Final    Hep B C IgM 05/03/2020 Negative  Negative Final    Hepatitis C Ab 05/03/2020 <0.1  0.0 - 0.9 s/co ratio Final    Comment: Negative:     < 0.8  Indeterminate: 0.8 - 0.9  Positive:     > 0.9  The CDC recommends that a positive HCV antibody result  be followed up with a HCV Nucleic Acid Amplification  test ([id number]).  Performed at:  [facility]  [id number]  : Lance Lopez MD, Phone:  [phone]      Sodium 05/04/2020 138  136 - 145 mmol/L Final    Potassium 05/04/2020 3.6  3.5 - 5.1 mmol/L Final    Chloride 05/04/2020 107  95 - 110 mmol/L Final    CO2 05/04/2020 23  23 - 29 mmol/L Final    Glucose 05/04/2020 114* 70 - 110 mg/dL Final    BUN, Bld 05/04/2020 10  6 - 20 mg/dL Final    Creatinine 05/04/2020 0.9  0.5 - 1.4 mg/dL Final    Calcium 05/04/2020 8.9  8.7 - 10.5 mg/dL Final    Anion Gap 05/04/2020 8  8 - 16 mmol/L Final    eGFR if African American 05/04/2020 >60.0  >60 mL/min/1.73 m^2 Final    eGFR if non African American 05/04/2020 >60.0  >60 mL/min/1.73 m^2 Final    Comment: Calculation used to obtain the estimated glomerular filtration  rate (eGFR) is the CKD-EPI equation.       Magnesium 05/04/2020 2.1  1.6 - 2.6 mg/dL Final    WBC 05/04/2020 12.14  3.90 - 12.70 K/uL Final    RBC 05/04/2020 4.44* 4.60 - 6.20 M/uL Final    Hemoglobin 05/04/2020 12.5* 14.0 - 18.0 g/dL Final    Hematocrit 05/04/2020 38.0* 40.0 - 54.0 % Final    Mean Corpuscular Volume 05/04/2020 86  82 - 98 fL Final    Mean Corpuscular Hemoglobin 05/04/2020 28.2  27.0 - 31.0 pg Final    Mean Corpuscular Hemoglobin Conc 05/04/2020 32.9  32.0 - 36.0 g/dL Final    RDW 05/04/2020 13.0  11.5 - 14.5 % Final    Platelets 05/04/2020 288  150 - 350 K/uL Final    MPV 05/04/2020 10.8  9.2 - 12.9 fL Final    Immature Granulocytes 05/04/2020 0.7* 0.0 - 0.5 % Final    Gran # (ANC) 05/04/2020 8.3* 1.8 - 7.7 K/uL Final    Immature Grans (Abs) 05/04/2020  0.08* 0.00 - 0.04 K/uL Final    Comment: Mild elevation in immature granulocytes is non specific and   can be seen in a variety of conditions including stress response,   acute inflammation, trauma and pregnancy. Correlation with other   laboratory and clinical findings is essential.      Lymph # 05/04/2020 2.4  1.0 - 4.8 K/uL Final    Mono # 05/04/2020 1.2* 0.3 - 1.0 K/uL Final    Eos # 05/04/2020 0.2  0.0 - 0.5 K/uL Final    Baso # 05/04/2020 0.03  0.00 - 0.20 K/uL Final    nRBC 05/04/2020 0  0 /100 WBC Final    Gran% 05/04/2020 68.3  38.0 - 73.0 % Final    Lymph% 05/04/2020 19.4  18.0 - 48.0 % Final    Mono% 05/04/2020 10.0  4.0 - 15.0 % Final    Eosinophil% 05/04/2020 1.4  0.0 - 8.0 % Final    Basophil% 05/04/2020 0.2  0.0 - 1.9 % Final    Differential Method 05/04/2020 Automated   Final    Vancomycin-Trough 05/04/2020 12.6  10.0 - 22.0 ug/mL Final   ]    Past Medical History:   Diagnosis Date    ADHD     ADHD (attention deficit hyperactivity disorder)     Panic attacks 2015     History reviewed. No pertinent surgical history.  Family History   Problem Relation Age of Onset    Pancreatic cancer Mother     Cancer Mother     Stroke Father     No Known Problems Sister     No Known Problems Brother        Marital Status: Single  Alcohol History:  reports no history of alcohol use.  Tobacco History:  reports that he has never smoked. He has never used smokeless tobacco.  Drug History:  reports no history of drug use.    Review of patient's allergies indicates:  No Known Allergies  No current outpatient medications on file.    Review of Systems   Constitutional: Negative for activity change, appetite change, chills, fatigue and fever.   HENT: Negative for congestion, ear pain, rhinorrhea and sore throat.    Eyes: Negative for redness and itching.   Respiratory: Negative for cough and shortness of breath.    Cardiovascular: Negative for chest pain.   Gastrointestinal: Negative for abdominal pain,  "constipation, diarrhea and nausea.   Genitourinary: Negative for difficulty urinating and frequency.   Musculoskeletal: Negative for myalgias.   Neurological: Negative for dizziness and headaches.   Psychiatric/Behavioral: Positive for depression. Negative for agitation, behavioral problems, sleep disturbance and suicidal ideas. The patient is not nervous/anxious.    All other systems reviewed and are negative.         Objective:      Vitals:    07/08/20 1100   BP: 120/80   Pulse: 91   Temp: 98.4 °F (36.9 °C)   SpO2: 97%   Weight: 108.4 kg (239 lb)   Height: 6' 1" (1.854 m)     Body mass index is 31.53 kg/m².  Physical Exam  Vitals signs and nursing note reviewed.   Constitutional:       Appearance: Normal appearance. He is well-developed.   HENT:      Head: Normocephalic.   Eyes:      Extraocular Movements: Extraocular movements intact.      Conjunctiva/sclera: Conjunctivae normal.      Pupils: Pupils are equal, round, and reactive to light.   Neck:      Musculoskeletal: Normal range of motion and neck supple.   Cardiovascular:      Rate and Rhythm: Normal rate and regular rhythm.      Pulses: Normal pulses.      Heart sounds: Normal heart sounds.   Pulmonary:      Effort: Pulmonary effort is normal.      Breath sounds: Normal breath sounds.   Abdominal:      General: Abdomen is flat. Bowel sounds are normal.      Palpations: Abdomen is soft. There is no mass.      Tenderness: There is no abdominal tenderness. There is no guarding.   Musculoskeletal: Normal range of motion.      Right lower leg: No edema.      Left lower leg: No edema.   Skin:     General: Skin is warm and dry.      Capillary Refill: Capillary refill takes less than 2 seconds.   Neurological:      Mental Status: He is alert and oriented to person, place, and time.   Psychiatric:         Attention and Perception: Attention normal.         Mood and Affect: Affect is blunt and flat.         Thought Content: Thought content normal.      Comments: " Avoids eye contact           Assessment:       1. Hospital discharge follow-up    2. Dyslipidemia    3. Fatigue, unspecified type    4. Fatty liver    5. Attention deficit hyperactivity disorder (ADHD), unspecified ADHD type    6. Panic attacks         Plan:       Hospital discharge follow-up  Right flank pain has completely resolved.    Dyslipidemia  Check lipid panel prior to follow-up.    Fatigue, unspecified type  Check CBC, TSH and free T4 as previously ordered prior to follow-up.    Fatty liver  Check CMP prior to follow-up.    Attention deficit hyperactivity disorder (ADHD), unspecified ADHD type  Currently controlled and well managed without medication.    Panic attacks  Currently controlled and well managed without medication.    Labs as previously ordered prior to follow-up.    Follow up in about 3 months (around 10/8/2020), or if symptoms worsen or fail to improve.

## 2020-10-08 ENCOUNTER — OFFICE VISIT (OUTPATIENT)
Dept: FAMILY MEDICINE | Facility: CLINIC | Age: 30
End: 2020-10-08
Payer: MEDICAID

## 2020-10-08 VITALS
BODY MASS INDEX: 30.35 KG/M2 | HEIGHT: 73 IN | SYSTOLIC BLOOD PRESSURE: 118 MMHG | OXYGEN SATURATION: 97 % | WEIGHT: 229 LBS | DIASTOLIC BLOOD PRESSURE: 82 MMHG | TEMPERATURE: 97 F | HEART RATE: 66 BPM

## 2020-10-08 DIAGNOSIS — Z23 NEED FOR INFLUENZA VACCINATION: ICD-10-CM

## 2020-10-08 DIAGNOSIS — F90.9 ATTENTION DEFICIT HYPERACTIVITY DISORDER (ADHD), UNSPECIFIED ADHD TYPE: Primary | ICD-10-CM

## 2020-10-08 DIAGNOSIS — R53.83 FATIGUE, UNSPECIFIED TYPE: ICD-10-CM

## 2020-10-08 DIAGNOSIS — E78.5 DYSLIPIDEMIA: ICD-10-CM

## 2020-10-08 DIAGNOSIS — K76.0 FATTY LIVER: ICD-10-CM

## 2020-10-08 DIAGNOSIS — Z13.29 SCREENING FOR THYROID DISORDER: ICD-10-CM

## 2020-10-08 PROCEDURE — 99213 OFFICE O/P EST LOW 20 MIN: CPT | Mod: S$PBB,,, | Performed by: NURSE PRACTITIONER

## 2020-10-08 PROCEDURE — 90686 IIV4 VACC NO PRSV 0.5 ML IM: CPT | Mod: PBBFAC | Performed by: NURSE PRACTITIONER

## 2020-10-08 PROCEDURE — 99213 OFFICE O/P EST LOW 20 MIN: CPT | Performed by: NURSE PRACTITIONER

## 2020-10-08 PROCEDURE — 99213 PR OFFICE/OUTPT VISIT, EST, LEVL III, 20-29 MIN: ICD-10-PCS | Mod: S$PBB,,, | Performed by: NURSE PRACTITIONER

## 2020-10-08 NOTE — PATIENT INSTRUCTIONS
Potassium-Rich Foods  The normal adult diet usually contains 2,000 mg to 4,000 mg of potassium per day. More potassium is needed when you lose too much potassium from your body. This can happen if you have diarrhea or vomiting. It can also happen if you take a medicine to make you urinate more (diuretic). To increase the amount of potassium in your diet, include these high-potassium foods.     [The (*) indicates foods highest in potassium.]  Vegetables  Artichokes. Cooked 1/2 cup, 200 mg to 300 mg*  Asparagus. Cooked 1/2 cup, 200 mg to 300 mg  Beans. White, red, bowers cooked 1/2 cup, 300 mg to 500 mg*  Beets. Cooked 1/2 cup, 200 mg to 300 mg  Broccoli. Cooked or raw 1 cup, 200 mg to 500 mg*  [place] sprouts. Cooked 1/2 cup, 200 mg to 300 mg  Cabbage. Raw 1 cup, 100 mg to 200 mg  Carrots. Raw or cooked 1/2 cup, 100 mg to 200 mg  Celery. Raw 1 cup, 200 mg to 300 mg  Lima beans. Fresh or frozen 1/2 cup, 300 mg to 500 mg*   Mushrooms. Raw or cooked 1/2 cup, 100 mg to 300 mg  Peas. Cooked 1/2 cup, 150 mg to 250 mg   Potatoes. Baked 1 medium, 500 mg to 900 mg*   Spinach. Cooked 1 cup, 800 mg to 900 mg*   Spinach. Raw 2 cups, 300 mg to 400 mg *  Squash, winter. Fresh, frozen, or cooked 1/2 cup, 200 mg to 400 mg   Tomato. Fresh 1 medium, 200 mg to 300 mg   Tomato juice. Canned 1/2 cup, 200 mg to 300 mg   Fruits  Apple juice. Unsweetened 1 cup, 200 mg to 300 mg   Apricots. Canned 1/2 cup, 200 mg to 300 mg   Apricots. Dried 4 pieces, 100 mg to 200 mg   Avocado. Raw 1/2 cup, 300 mg to 400 mg*  Banana. Fresh 1 small, 300 mg to 400 mg*   Cantaloupe. Fresh 1 cup diced, 300 mg to 400 mg*   Grape juice. Unsweetened 1 cup, 200 mg to 300 mg   Honeydew melon. Fresh 1 cup diced, 300 mg to 400 mg*   Orange. Fresh 1 medium, 200 mg to 300 mg    Orange juice. Unsweetened, fresh or frozen 1/2 cup, 200 mg to 300 mg  Pineapple juice. Unsweetened 1 cup, 300 mg to 400 mg   Prune juice. Unsweetened 1/2 cup, 300 mg to 400 mg*   Prunes. Dried 5  pieces, 300 mg to 400 mg*   Strawberries. Fresh or frozen 1 cup, 200 mg to 300 mg  Meat  Red meat. Cooked 3 ounces, 100 mg to 300 mg   Seafood  Cod, flounder, halibut. Cooked 3 ounces, 100 mg to 300 mg*  [place]. Cooked, 3 ounces 300 mg to 400 mg*   Scallops. Cooked 3 ounces, 200 mg to 300 mg*  Shrimp. Cooked 3/4 cup, 100 mg to 200 mg   Tuna. Fresh or canned 3/4 cup, 200 mg to 500 mg   Date Last Reviewed: 10/1/2016  © 6555-3990 [employer]. [address] [place] [address], [place] [address]. All rights reserved. This information is not intended as a substitute for professional medical care. Always follow your healthcare professional's instructions.        Low-Salt Diet  This diet removes foods that are high in salt. It also limits the amount of salt you use when cooking. It is most often used for people with high blood pressure, edema (fluid retention), and kidney, liver, or heart disease.  Table salt contains the mineral sodium. Your body needs sodium to work normally. But too much sodium can make your health problems worse. Your healthcare provider is recommending a low-salt (also called low-sodium) diet for you. Your total daily allowance of salt is 1,500 to 2,300 milligrams (mg). It is less than 1 teaspoon of table salt. This means you can have only about 500 to 700 mg of sodium at each meal. People with certain health problems should limit salt intake to the lower end of the recommended range.    When you cook, dont add much salt. If you can cook without using salt, even better. Dont add salt to your food at the table.  When shopping, read food labels. Salt is often called sodium on the label. Choose foods that are salt-free, low salt, or very low salt. Note that foods with reduced salt may not lower your salt intake enough.    Beans, potatoes, and pasta  Ok: Dry beans, split peas, lentils, potatoes, rice, macaroni, pasta, spaghetti without added salt  Avoid: Potato chips, tortilla chips, and similar  products  Breads and cereals  Ok: Low-sodium breads, rolls, cereals, and cakes; low-salt crackers, matzo crackers  Avoid: Salted crackers, pretzels, popcorn, M[place]ese toast, pancakes, muffins  Dairy  Ok: Milk, chocolate milk, hot chocolate mix, low-salt cheeses, and yogurt  Avoid: Processed cheese and cheese spreads; Roquefort, Camembert, and cottage cheese; buttermilk, instant breakfast drink  Desserts  Ok: Ice cream, frozen yogurt, juice bars, gelatin, cookies and pies, sugar, honey, jelly, hard candy  Avoid: Most pies, cakes and cookies prepared or processed with salt; instant pudding  Drinks  Ok: Tea, coffee, fizzy (carbonated) drinks, juices  Avoid: Flavored coffees, electrolyte replacement drinks, sports drinks  Meats  Ok: All fresh meat, fish, poultry, low-salt tuna, eggs, egg substitute  Avoid: Smoked, pickled, brine-cured, or salted meats and fish. This includes mckeon, chipped beef, corned beef, hot dogs, deli meats, ham, kosher meats, salt pork, sausage, canned tuna, salted codfish, smoked salmon, herring, sardines, or anchovies.  Seasonings and spices  Ok: Most seasonings are okay. Good substitutes for salt include: fresh herb blends, hot sauce, lemon, garlic, juarez, vinegar, dry mustard, parsley, cilantro, horseradish, tomato paste, regular margarine, mayonnaise, unsalted butter, cream cheese, vegetable oil, cream, low-salt salad dressing and gravy.  Avoid: Regular ketchup, relishes, pickles, soy sauce, teriyaki sauce, Worcestershire sauce, BBQ sauce, tartar sauce, meat tenderizer, chili sauce, regular gravy, regular salad dressing, salted butter  Soups  Ok: Low-salt soups and broths made with allowed foods  Avoid: Bouillon cubes, soups with smoked or salted meats, regular soup and broth  Vegetables  Ok: Most vegetables are okay; also low-salt tomato and vegetable juices  Avoid: Sauerkraut and other brine-soaked vegetables; pickles and other pickled vegetables; tomato juice, olives  Date Last Reviewed:  8/1/2016  © 7130-0863 [employer]. [address] [place] [address], [place] [address]. All rights reserved. This information is not intended as a substitute for professional medical care. Always follow your healthcare professional's instructions.        Eating Heart-Healthy Foods  Eating has a big impact on your heart health. In fact, eating healthier can improve several of your heart risks at once. For instance, it helps you manage weight, cholesterol, and blood pressure. Here are ideas to help you make heart-healthy changes without giving up all the foods and flavors you love.  Getting started  · Talk with your health care provider about eating plans, such as the DASH or Mediterranean diet. You may also be referred to a dietitian.  · Change a few things at a time. Give yourself time to get used to a few eating changes before adding more.  · Work to create a tasty, healthy eating plan that you can stick to for the rest of your life.    Goals for healthy eating  Below are some tips to improve your eating habits:  · Limit saturated fats and trans fats. Saturated fats raise your levels of cholesterol, so keep these fats to a minimum. They are found in foods such as fatty meats, whole milk, cheese, and palm and coconut oils. Avoid trans fats because they lower good cholesterol as well as raise bad cholesterol. Trans fats are most often found in processed foods.  · Reduce sodium (salt) intake. Eating too much salt may increase your blood pressure. Limit your sodium intake to 2,300 milligrams (mg) per day, or less if your health care provider recommends it. Dining out less often and eating fewer processed foods are two great ways to decrease the amount of salt you consume.  · Managing calories. A calorie is a unit of energy. Your body burns calories for fuel, but if you eat more calories than your body burns, the extras are stored as fat. Your health care provider can help you create a diet plan to manage your  calories. This will likely include eating healthier foods as well as exercising regularly. To help you track your progress, keep a diary to record what you eat and how often you exercise.  Choose the right foods  Aim to make these foods staples of your diet. If you have diabetes, you may have different recommendations than what is listed here:  · Fruits and vegetable provide plenty of nutrients without a lot of calories. At meals, fill half your plate with these foods. Split the other half of your plate between whole grains and lean protein.  · Whole grains are high in fiber and rich in vitamins and nutrients. Good choices include whole-wheat bread, pasta, and brown rice.  · Lean proteins give you nutrition with less fat. Good choices include fish, skinless chicken, and beans.  · Low-fat or nonfat dairy provides nutrients without a lot of fat. Try low-fat or nonfat milk, cheese, or yogurt.  · Healthy fats can be good for you in small amounts. These are unsaturated fats, such as olive oil, nuts, and fish. Try to have at least 2 servings per week of fatty fish such as salmon, sardines, mackerel, rainbow trout, and albacore tuna. These contain omega-3 fatty acids, which are good for your heart. Flaxseed is another source of a heart-healthy fat.  More on heart healthy eating    Read food labels  Healthy eating starts at the grocery store. Be sure to pay attention to food labels on packaged foods. Look for products that are high in fiber and protein, and low in saturated fat, cholesterol, and sodium. Avoid products that contain trans fat. And pay close attention to serving size. For instance, if you plan to eat two servings, double all the numbers on the label.  Prepare food right  A key part of healthy cooking is cutting down on added fat and salt. Look on the internet for lower-fat, lower-sodium recipes. Also, try these tips:  · Remove fat from meat and skin from poultry before cooking.  · Skim fat from the surface of  soups and sauces.  · Broil, boil, bake, steam, grill, and microwave food without added fats.  · Choose ingredients that spice up your food without adding calories, fat, or sodium. Try these items: horseradish, hot sauce, lemon, mustard, nonfat salad dressings, and vinegar. For salt-free herbs and spices, try basil, cilantro, cinnamon, pepper, and rosemary.  Date Last Reviewed: 6/25/2015  © 6421-9440 [employer]. [address] [place] [address]. All rights reserved. This information is not intended as a substitute for professional medical care. Always follow your healthcare professional's instructions.

## 2020-10-08 NOTE — PROGRESS NOTES
SUBJECTIVE:    Patient ID: Adam Nettles is a 30 y.o. male.    Chief Complaint: Follow-up, Depression, Anxiety, ADHD, and Fatty Liver    Mr. Nettles is a 30-year-old gentleman here for f/u depression and anxiety which is improved.  Has been exercising and watching his diet, cut back significantly on caffeine intake.  Has lost 10lb.  Considering applying to [employer].      History significant for ADHD and panic attacks.  He is well-versed in mental health terminology.  He lives with his father and his sister.  Reports some adjustment problems since his sister has moved back in.  He has established with psych and is being seen routinely.     Denies any chest pain, palpitations or dyspnea.  Denies dyspepsia, cough, hemoptysis or melena.      Previously ordered labs not yet completed.      Per history:  He would still like to be evaluated for autism spectrum disorder, however has not found a provider to do this.  He reports that he had panic attack while in the , for reason he does not wish to discuss today, and 'was sent to [facility]', treated and evaluated for anxiety/panic.  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  Reports that this was the time of his 1st suicide attempt, 'found his father's 45 revolver and tried to use it, however he could not figure out how to load it'.  He has had several attempts.  He does report suicidal ideation few months ago.  No recent.             Past Medical History:   Diagnosis Date    ADHD     ADHD (attention deficit hyperactivity disorder)     Panic attacks 2015     History reviewed. No pertinent surgical history.  Family History   Problem Relation Age of Onset    Pancreatic cancer Mother     Cancer Mother     Stroke Father     No Known Problems Sister     No Known Problems Brother        Marital Status:  "Single  Alcohol History:  reports no history of alcohol use.  Tobacco History:  reports that he has never smoked. He has never used smokeless tobacco.  Drug History:  reports no history of drug use.    Review of patient's allergies indicates:  No Known Allergies  No current outpatient medications on file.    Review of Systems   Constitutional: Negative for activity change, appetite change, chills, fatigue and fever.   HENT: Negative for congestion, ear pain, rhinorrhea and sore throat.    Eyes: Negative for redness and itching.   Respiratory: Negative for cough and shortness of breath.    Cardiovascular: Negative for chest pain.   Gastrointestinal: Negative for abdominal pain, constipation, diarrhea and nausea.   Genitourinary: Negative for difficulty urinating and frequency.   Musculoskeletal: Negative for myalgias.   Neurological: Negative for dizziness and headaches.   Psychiatric/Behavioral: Negative for agitation, behavioral problems, sleep disturbance and suicidal ideas. The patient is not nervous/anxious.    All other systems reviewed and are negative.         Objective:      Vitals:    10/08/20 1456   BP: 118/82   Pulse: 66   Temp: 97.4 °F (36.3 °C)   SpO2: 97%   Weight: 103.9 kg (229 lb)   Height: 6' 1" (1.854 m)     Body mass index is 30.21 kg/m².  Physical Exam  Vitals signs and nursing note reviewed.   Constitutional:       Appearance: Normal appearance. He is well-developed and overweight.   HENT:      Head: Normocephalic.   Eyes:      Extraocular Movements: Extraocular movements intact.      Conjunctiva/sclera: Conjunctivae normal.      Pupils: Pupils are equal, round, and reactive to light.   Neck:      Musculoskeletal: Normal range of motion and neck supple.      Vascular: No carotid bruit.   Cardiovascular:      Rate and Rhythm: Normal rate and regular rhythm.      Pulses: Normal pulses.      Heart sounds: Normal heart sounds.   Pulmonary:      Effort: Pulmonary effort is normal.      Breath sounds: " Normal breath sounds.   Abdominal:      General: Bowel sounds are normal.      Palpations: Abdomen is soft.      Tenderness: There is no abdominal tenderness.   Musculoskeletal: Normal range of motion.      Right lower leg: No edema.      Left lower leg: No edema.   Skin:     General: Skin is warm and dry.      Capillary Refill: Capillary refill takes less than 2 seconds.   Neurological:      Mental Status: He is alert and oriented to person, place, and time.   Psychiatric:         Thought Content: Thought content normal.           Assessment:       1. Attention deficit hyperactivity disorder (ADHD), unspecified ADHD type    2. Dyslipidemia    3. Fatigue, unspecified type    4. Fatty liver    5. Need for influenza vaccination    6. Screening for thyroid disorder         Plan:       Attention deficit hyperactivity disorder (ADHD), unspecified ADHD type  Noted.  No Rx.     Dyslipidemia  -     Lipid Panel; Future; Expected date: 10/08/2020    Fatigue, unspecified type  -     CBC auto differential; Future; Expected date: 10/08/2020  -     Comprehensive Metabolic Panel; Future; Expected date: 10/08/2020  -     TSH; Future; Expected date: 10/08/2020  -     T4, Free; Future; Expected date: 10/08/2020    Fatty liver  Following.     Need for influenza vaccination  -     Influenza - Quadrivalent (PF)    Screening for thyroid disorder  -     TSH; Future; Expected date: 10/08/2020  -     T4, Free; Future; Expected date: 10/08/2020      Follow up in about 6 months (around 4/8/2021), or if symptoms worsen or fail to improve.

## 2021-04-28 ENCOUNTER — OFFICE VISIT (OUTPATIENT)
Dept: FAMILY MEDICINE | Facility: CLINIC | Age: 31
End: 2021-04-28
Payer: MEDICAID

## 2021-04-28 VITALS
HEART RATE: 91 BPM | HEIGHT: 73 IN | DIASTOLIC BLOOD PRESSURE: 82 MMHG | SYSTOLIC BLOOD PRESSURE: 112 MMHG | OXYGEN SATURATION: 97 % | RESPIRATION RATE: 20 BRPM | BODY MASS INDEX: 31.77 KG/M2 | WEIGHT: 239.69 LBS

## 2021-04-28 DIAGNOSIS — R41.840 POOR CONCENTRATION: ICD-10-CM

## 2021-04-28 DIAGNOSIS — R06.83 SNORING: ICD-10-CM

## 2021-04-28 DIAGNOSIS — R45.1 FEELING AGITATED: ICD-10-CM

## 2021-04-28 DIAGNOSIS — G47.00 INSOMNIA, UNSPECIFIED TYPE: Primary | ICD-10-CM

## 2021-04-28 DIAGNOSIS — R40.0 DAYTIME SOMNOLENCE: ICD-10-CM

## 2021-04-28 PROCEDURE — 99215 OFFICE O/P EST HI 40 MIN: CPT | Performed by: NURSE PRACTITIONER

## 2021-04-28 PROCEDURE — 99214 PR OFFICE/OUTPT VISIT, EST, LEVL IV, 30-39 MIN: ICD-10-PCS | Mod: S$PBB,,, | Performed by: NURSE PRACTITIONER

## 2021-04-28 PROCEDURE — 99214 OFFICE O/P EST MOD 30 MIN: CPT | Mod: S$PBB,,, | Performed by: NURSE PRACTITIONER

## 2021-04-28 RX ORDER — TRAZODONE HYDROCHLORIDE 50 MG/1
50 TABLET ORAL NIGHTLY PRN
Qty: 30 TABLET | Refills: 2 | Status: SHIPPED | OUTPATIENT
Start: 2021-04-28 | End: 2022-02-07

## 2021-05-20 ENCOUNTER — OFFICE VISIT (OUTPATIENT)
Dept: PULMONOLOGY | Facility: CLINIC | Age: 31
End: 2021-05-20
Payer: MEDICAID

## 2021-05-20 VITALS
BODY MASS INDEX: 32.74 KG/M2 | SYSTOLIC BLOOD PRESSURE: 114 MMHG | WEIGHT: 247 LBS | DIASTOLIC BLOOD PRESSURE: 49 MMHG | OXYGEN SATURATION: 98 % | HEART RATE: 84 BPM | HEIGHT: 73 IN

## 2021-05-20 DIAGNOSIS — G47.50 PARASOMNIA, UNSPECIFIED TYPE: Primary | ICD-10-CM

## 2021-05-20 DIAGNOSIS — R40.0 DAYTIME SOMNOLENCE: ICD-10-CM

## 2021-05-20 DIAGNOSIS — R06.83 SNORING: ICD-10-CM

## 2021-05-20 PROCEDURE — 99214 OFFICE O/P EST MOD 30 MIN: CPT | Mod: S$GLB,,, | Performed by: NURSE PRACTITIONER

## 2021-05-20 PROCEDURE — 99214 PR OFFICE/OUTPT VISIT, EST, LEVL IV, 30-39 MIN: ICD-10-PCS | Mod: S$GLB,,, | Performed by: NURSE PRACTITIONER

## 2021-06-03 ENCOUNTER — PROCEDURE VISIT (OUTPATIENT)
Dept: SLEEP MEDICINE | Facility: HOSPITAL | Age: 31
End: 2021-06-03
Payer: MEDICAID

## 2021-06-03 ENCOUNTER — HOSPITAL ENCOUNTER (OUTPATIENT)
Dept: PREADMISSION TESTING | Facility: HOSPITAL | Age: 31
Discharge: HOME | End: 2021-06-03
Attending: NURSE PRACTITIONER
Payer: MEDICAID

## 2021-06-03 DIAGNOSIS — G47.50 PARASOMNIA, UNSPECIFIED TYPE: ICD-10-CM

## 2021-06-03 DIAGNOSIS — R40.0 DAYTIME SOMNOLENCE: ICD-10-CM

## 2021-06-03 DIAGNOSIS — R06.83 SNORING: ICD-10-CM

## 2021-06-03 LAB — SARS-COV-2 RDRP RESP QL NAA+PROBE: NEGATIVE

## 2021-06-03 PROCEDURE — U0002 COVID-19 LAB TEST NON-CDC: HCPCS | Performed by: NURSE PRACTITIONER

## 2021-06-03 PROCEDURE — 95810 POLYSOM 6/> YRS 4/> PARAM: CPT

## 2021-06-09 ENCOUNTER — TELEPHONE (OUTPATIENT)
Dept: PULMONOLOGY | Facility: CLINIC | Age: 31
End: 2021-06-09

## 2021-06-25 ENCOUNTER — TELEPHONE (OUTPATIENT)
Dept: FAMILY MEDICINE | Facility: CLINIC | Age: 31
End: 2021-06-25

## 2021-06-25 ENCOUNTER — LAB VISIT (OUTPATIENT)
Dept: LAB | Facility: HOSPITAL | Age: 31
End: 2021-06-25
Attending: NURSE PRACTITIONER
Payer: MEDICAID

## 2021-06-25 ENCOUNTER — OFFICE VISIT (OUTPATIENT)
Dept: FAMILY MEDICINE | Facility: CLINIC | Age: 31
End: 2021-06-25
Payer: MEDICAID

## 2021-06-25 VITALS
DIASTOLIC BLOOD PRESSURE: 70 MMHG | SYSTOLIC BLOOD PRESSURE: 122 MMHG | OXYGEN SATURATION: 97 % | WEIGHT: 246.56 LBS | HEIGHT: 73 IN | HEART RATE: 87 BPM | RESPIRATION RATE: 18 BRPM | BODY MASS INDEX: 32.68 KG/M2

## 2021-06-25 DIAGNOSIS — R53.83 FATIGUE, UNSPECIFIED TYPE: ICD-10-CM

## 2021-06-25 DIAGNOSIS — Z11.4 SCREENING FOR HIV (HUMAN IMMUNODEFICIENCY VIRUS): ICD-10-CM

## 2021-06-25 DIAGNOSIS — E78.5 DYSLIPIDEMIA: ICD-10-CM

## 2021-06-25 DIAGNOSIS — G47.00 INSOMNIA, UNSPECIFIED TYPE: Primary | ICD-10-CM

## 2021-06-25 LAB
ALBUMIN SERPL BCP-MCNC: 4.1 G/DL (ref 3.5–5.2)
ALP SERPL-CCNC: 93 U/L (ref 55–135)
ALT SERPL W/O P-5'-P-CCNC: 62 U/L (ref 10–44)
ANION GAP SERPL CALC-SCNC: 11 MMOL/L (ref 8–16)
AST SERPL-CCNC: 34 U/L (ref 10–40)
BASOPHILS # BLD AUTO: 0.04 K/UL (ref 0–0.2)
BASOPHILS NFR BLD: 0.6 % (ref 0–1.9)
BILIRUB SERPL-MCNC: 0.9 MG/DL (ref 0.1–1)
BUN SERPL-MCNC: 18 MG/DL (ref 6–20)
CALCIUM SERPL-MCNC: 9.4 MG/DL (ref 8.7–10.5)
CHLORIDE SERPL-SCNC: 103 MMOL/L (ref 95–110)
CHOLEST SERPL-MCNC: 272 MG/DL (ref 120–199)
CHOLEST/HDLC SERPL: 6 {RATIO} (ref 2–5)
CO2 SERPL-SCNC: 27 MMOL/L (ref 23–29)
CREAT SERPL-MCNC: 0.8 MG/DL (ref 0.5–1.4)
DIFFERENTIAL METHOD: NORMAL
EOSINOPHIL # BLD AUTO: 0.3 K/UL (ref 0–0.5)
EOSINOPHIL NFR BLD: 3.8 % (ref 0–8)
ERYTHROCYTE [DISTWIDTH] IN BLOOD BY AUTOMATED COUNT: 13 % (ref 11.5–14.5)
EST. GFR  (AFRICAN AMERICAN): >60 ML/MIN/1.73 M^2
EST. GFR  (NON AFRICAN AMERICAN): >60 ML/MIN/1.73 M^2
GLUCOSE SERPL-MCNC: 110 MG/DL (ref 70–110)
HCT VFR BLD AUTO: 42.7 % (ref 40–54)
HDLC SERPL-MCNC: 45 MG/DL (ref 40–75)
HDLC SERPL: 16.5 % (ref 20–50)
HGB BLD-MCNC: 14 G/DL (ref 14–18)
IMM GRANULOCYTES # BLD AUTO: 0.01 K/UL (ref 0–0.04)
IMM GRANULOCYTES NFR BLD AUTO: 0.2 % (ref 0–0.5)
LDLC SERPL CALC-MCNC: 196.8 MG/DL (ref 63–159)
LYMPHOCYTES # BLD AUTO: 2.6 K/UL (ref 1–4.8)
LYMPHOCYTES NFR BLD: 39.6 % (ref 18–48)
MCH RBC QN AUTO: 28.3 PG (ref 27–31)
MCHC RBC AUTO-ENTMCNC: 32.8 G/DL (ref 32–36)
MCV RBC AUTO: 86 FL (ref 82–98)
MONOCYTES # BLD AUTO: 0.6 K/UL (ref 0.3–1)
MONOCYTES NFR BLD: 9.5 % (ref 4–15)
NEUTROPHILS # BLD AUTO: 3.1 K/UL (ref 1.8–7.7)
NEUTROPHILS NFR BLD: 46.3 % (ref 38–73)
NONHDLC SERPL-MCNC: 227 MG/DL
NRBC BLD-RTO: 0 /100 WBC
PLATELET # BLD AUTO: 313 K/UL (ref 150–450)
PMV BLD AUTO: 11 FL (ref 9.2–12.9)
POTASSIUM SERPL-SCNC: 4 MMOL/L (ref 3.5–5.1)
PROT SERPL-MCNC: 7.8 G/DL (ref 6–8.4)
RBC # BLD AUTO: 4.95 M/UL (ref 4.6–6.2)
SODIUM SERPL-SCNC: 141 MMOL/L (ref 136–145)
T4 FREE SERPL-MCNC: 0.7 NG/DL (ref 0.71–1.51)
TRIGL SERPL-MCNC: 151 MG/DL (ref 30–150)
TSH SERPL DL<=0.005 MIU/L-ACNC: 1.24 UIU/ML (ref 0.34–5.6)
WBC # BLD AUTO: 6.66 K/UL (ref 3.9–12.7)

## 2021-06-25 PROCEDURE — 84439 ASSAY OF FREE THYROXINE: CPT | Performed by: NURSE PRACTITIONER

## 2021-06-25 PROCEDURE — 99214 PR OFFICE/OUTPT VISIT, EST, LEVL IV, 30-39 MIN: ICD-10-PCS | Mod: S$PBB,,, | Performed by: NURSE PRACTITIONER

## 2021-06-25 PROCEDURE — 80061 LIPID PANEL: CPT | Performed by: NURSE PRACTITIONER

## 2021-06-25 PROCEDURE — 87389 HIV-1 AG W/HIV-1&-2 AB AG IA: CPT | Performed by: NURSE PRACTITIONER

## 2021-06-25 PROCEDURE — 36415 COLL VENOUS BLD VENIPUNCTURE: CPT | Performed by: NURSE PRACTITIONER

## 2021-06-25 PROCEDURE — 80053 COMPREHEN METABOLIC PANEL: CPT | Performed by: NURSE PRACTITIONER

## 2021-06-25 PROCEDURE — 85025 COMPLETE CBC W/AUTO DIFF WBC: CPT | Performed by: NURSE PRACTITIONER

## 2021-06-25 PROCEDURE — 99214 OFFICE O/P EST MOD 30 MIN: CPT | Mod: S$PBB,,, | Performed by: NURSE PRACTITIONER

## 2021-06-25 PROCEDURE — 99214 OFFICE O/P EST MOD 30 MIN: CPT | Performed by: NURSE PRACTITIONER

## 2021-06-25 PROCEDURE — 84443 ASSAY THYROID STIM HORMONE: CPT | Performed by: NURSE PRACTITIONER

## 2021-06-25 RX ORDER — SUVOREXANT 10 MG/1
TABLET, FILM COATED ORAL NIGHTLY PRN
Qty: 30 TABLET | Refills: 5 | Status: SHIPPED | OUTPATIENT
Start: 2021-06-25 | End: 2022-02-07

## 2021-06-25 RX ORDER — ATORVASTATIN CALCIUM 20 MG/1
20 TABLET, FILM COATED ORAL NIGHTLY
Qty: 90 TABLET | Refills: 1 | Status: SHIPPED | OUTPATIENT
Start: 2021-06-25 | End: 2022-02-07

## 2021-06-26 LAB — HIV 1+2 AB+HIV1 P24 AG SERPL QL IA: NON REACTIVE

## 2022-02-07 ENCOUNTER — OFFICE VISIT (OUTPATIENT)
Dept: FAMILY MEDICINE | Facility: CLINIC | Age: 32
End: 2022-02-07
Payer: MEDICAID

## 2022-02-07 VITALS
OXYGEN SATURATION: 97 % | SYSTOLIC BLOOD PRESSURE: 110 MMHG | TEMPERATURE: 98 F | BODY MASS INDEX: 33.75 KG/M2 | HEART RATE: 79 BPM | DIASTOLIC BLOOD PRESSURE: 80 MMHG | WEIGHT: 254.63 LBS | HEIGHT: 73 IN

## 2022-02-07 DIAGNOSIS — F90.9 ATTENTION DEFICIT HYPERACTIVITY DISORDER (ADHD), UNSPECIFIED ADHD TYPE: ICD-10-CM

## 2022-02-07 DIAGNOSIS — E78.5 DYSLIPIDEMIA: ICD-10-CM

## 2022-02-07 DIAGNOSIS — G47.00 INSOMNIA, UNSPECIFIED TYPE: Primary | ICD-10-CM

## 2022-02-07 DIAGNOSIS — Z00.00 HEALTHCARE MAINTENANCE: ICD-10-CM

## 2022-02-07 DIAGNOSIS — Z23 NEED FOR INFLUENZA VACCINATION: ICD-10-CM

## 2022-02-07 PROBLEM — R06.83 SNORING: Status: RESOLVED | Noted: 2021-04-28 | Resolved: 2022-02-07

## 2022-02-07 PROBLEM — R53.83 FATIGUE: Status: RESOLVED | Noted: 2020-07-08 | Resolved: 2022-02-07

## 2022-02-07 PROCEDURE — 1160F PR REVIEW ALL MEDS BY PRESCRIBER/CLIN PHARMACIST DOCUMENTED: ICD-10-PCS | Mod: ,,, | Performed by: NURSE PRACTITIONER

## 2022-02-07 PROCEDURE — 1160F RVW MEDS BY RX/DR IN RCRD: CPT | Mod: ,,, | Performed by: NURSE PRACTITIONER

## 2022-02-07 PROCEDURE — 99214 OFFICE O/P EST MOD 30 MIN: CPT | Performed by: NURSE PRACTITIONER

## 2022-02-07 PROCEDURE — 3008F BODY MASS INDEX DOCD: CPT | Mod: ,,, | Performed by: NURSE PRACTITIONER

## 2022-02-07 PROCEDURE — 99214 OFFICE O/P EST MOD 30 MIN: CPT | Mod: S$PBB,,, | Performed by: NURSE PRACTITIONER

## 2022-02-07 PROCEDURE — 3008F PR BODY MASS INDEX (BMI) DOCUMENTED: ICD-10-PCS | Mod: ,,, | Performed by: NURSE PRACTITIONER

## 2022-02-07 PROCEDURE — 99214 PR OFFICE/OUTPT VISIT, EST, LEVL IV, 30-39 MIN: ICD-10-PCS | Mod: S$PBB,,, | Performed by: NURSE PRACTITIONER

## 2022-02-07 PROCEDURE — 90686 IIV4 VACC NO PRSV 0.5 ML IM: CPT | Mod: PBBFAC | Performed by: NURSE PRACTITIONER

## 2022-02-07 RX ORDER — DEXTROAMPHETAMINE SACCHARATE, AMPHETAMINE ASPARTATE, DEXTROAMPHETAMINE SULFATE AND AMPHETAMINE SULFATE 2.5; 2.5; 2.5; 2.5 MG/1; MG/1; MG/1; MG/1
TABLET ORAL
COMMUNITY
Start: 2021-08-19 | End: 2022-05-10

## 2022-02-07 RX ORDER — DEXTROAMPHETAMINE SACCHARATE, AMPHETAMINE ASPARTATE, DEXTROAMPHETAMINE SULFATE AND AMPHETAMINE SULFATE 5; 5; 5; 5 MG/1; MG/1; MG/1; MG/1
1 TABLET ORAL DAILY
COMMUNITY
Start: 2021-10-07 | End: 2022-05-10

## 2022-02-07 NOTE — PROGRESS NOTES
SUBJECTIVE:      Patient ID: Adam Nettles is a 32 y.o. male.    Chief Complaint: Insomnia (F/u, patient states that his insomnia has subsided and no longer needs the medication.) and lab review    32-year-old male presents to the clinic for a routine follow-up. He is a former patient of ASHVIN Boles.  He has been sleeping well and no longer requires medications such as trazodone or belsomra.  He never started the Lipitor that was prescribed.  Previous LDL was 196.  He has been working out at the gym since January 2022.  He does 15-20 min on the exercise bike followed by 30-60 min of swimming, 3-4 days per week.  Takes Adderall for ADHD, which is managed by [facility].  His previous therapist is no longer there and needs establish with a new provider.  He is doing well today and offers no new complaints.     Hyperlipidemia  This is a chronic problem. The current episode started more than 1 month ago. The problem is uncontrolled. Recent lipid tests were reviewed and are high. Exacerbating diseases include obesity. He has no history of chronic renal disease, diabetes, hypothyroidism, liver disease or nephrotic syndrome. Factors aggravating his hyperlipidemia include fatty foods. Pertinent negatives include no chest pain, focal sensory loss, focal weakness, leg pain, myalgias or shortness of breath. Current antihyperlipidemic treatment includes diet change and exercise. Improvement on treatment: Labs pending. Risk factors for coronary artery disease include dyslipidemia, male sex and obesity.       Family History   Problem Relation Age of Onset    Pancreatic cancer Mother     Cancer Mother     Stroke Father     No Known Problems Sister     No Known Problems Brother       Social History     Socioeconomic History    Marital status: Single    Number of children: 0   Occupational History    Occupation:    Tobacco Use    Smoking status: Never Smoker    Smokeless tobacco: Never  "Used   Substance and Sexual Activity    Alcohol use: Not Currently    Drug use: Never    Sexual activity: Not Currently     Current Outpatient Medications   Medication Sig Dispense Refill    dextroamphetamine-amphetamine (ADDERALL) 20 mg tablet Take 1 tablet by mouth once daily.      dextroamphetamine-amphetamine 10 mg Tab        No current facility-administered medications for this visit.     Review of patient's allergies indicates:  No Known Allergies   Past Medical History:   Diagnosis Date    ADHD     ADHD (attention deficit hyperactivity disorder)     Panic attacks 2015     No past surgical history on file.    Review of Systems   Constitutional: Negative for activity change, appetite change, chills, diaphoresis, fatigue, fever and unexpected weight change.   HENT: Negative for congestion, ear pain, sinus pressure, sore throat, trouble swallowing and voice change.    Eyes: Negative for pain, discharge and visual disturbance.   Respiratory: Negative for cough, chest tightness, shortness of breath and wheezing.    Cardiovascular: Negative for chest pain and palpitations.   Gastrointestinal: Negative for abdominal pain, constipation, diarrhea, nausea and vomiting.   Genitourinary: Negative for difficulty urinating, flank pain, frequency and urgency.   Musculoskeletal: Negative for back pain, joint swelling and myalgias.   Skin: Negative for color change and rash.   Neurological: Negative for dizziness, focal weakness, seizures, syncope, weakness, numbness and headaches.   Hematological: Negative for adenopathy.   Psychiatric/Behavioral: Negative for dysphoric mood and sleep disturbance. The patient is not nervous/anxious.       OBJECTIVE:      Vitals:    02/07/22 1530   BP: 110/80   BP Location: Right arm   Patient Position: Sitting   BP Method: Large (Manual)   Pulse: 79   Temp: 98.2 °F (36.8 °C)   SpO2: 97%   Weight: 115.5 kg (254 lb 9.6 oz)   Height: 6' 1" (1.854 m)     Physical Exam  Vitals and nursing " note reviewed.   Constitutional:       General: He is awake. He is not in acute distress.     Appearance: Normal appearance. He is obese. He is not ill-appearing, toxic-appearing or diaphoretic.   HENT:      Head: Normocephalic and atraumatic.      Nose: Nose normal.   Eyes:      General: Lids are normal. Gaze aligned appropriately.      Conjunctiva/sclera: Conjunctivae normal.      Right eye: Right conjunctiva is not injected.      Left eye: Left conjunctiva is not injected.      Pupils: Pupils are equal, round, and reactive to light.   Cardiovascular:      Rate and Rhythm: Normal rate and regular rhythm.      Pulses: Normal pulses.      Heart sounds: Normal heart sounds, S1 normal and S2 normal. No murmur heard.  No friction rub. No gallop.    Pulmonary:      Effort: Pulmonary effort is normal. No respiratory distress.      Breath sounds: Normal breath sounds. No stridor. No decreased breath sounds, wheezing, rhonchi or rales.   Chest:      Chest wall: No tenderness.   Musculoskeletal:      Cervical back: Neck supple.      Right lower leg: No edema.      Left lower leg: No edema.   Lymphadenopathy:      Cervical: No cervical adenopathy.   Skin:     General: Skin is warm and dry.      Capillary Refill: Capillary refill takes less than 2 seconds.      Findings: No erythema or rash.   Neurological:      Mental Status: He is alert and oriented to person, place, and time. Mental status is at baseline.   Psychiatric:         Attention and Perception: Attention normal.         Mood and Affect: Mood normal.         Speech: Speech normal.         Behavior: Behavior normal. Behavior is cooperative.         Thought Content: Thought content normal.         Judgment: Judgment normal.        Assessment:       1. Insomnia, unspecified type    2. Dyslipidemia    3. Attention deficit hyperactivity disorder (ADHD), unspecified ADHD type    4. Healthcare maintenance    5. Need for influenza vaccination        Plan:       Insomnia,  unspecified type  Resolved.      Dyslipidemia  Suspect it will be uncontrolled.  If so, will prescribe Lipitor 20 mg.  Avoid fried fatty foods and decrease the amount of saturated fats.  A diet incorporated with fresh vegetables, fruits, legumes, nuts, whole grains, and fish is recommended. Continue exercising.  -     Comprehensive Metabolic Panel; Future; Expected date: 02/07/2022  -     Lipid Panel; Future; Expected date: 02/07/2022  -     TSH; Future; Expected date: 02/07/2022    Attention deficit hyperactivity disorder (ADHD), unspecified ADHD type  Continue as prescribed.  Managed by psychiatry.      Healthcare maintenance  -     Comprehensive Metabolic Panel; Future; Expected date: 02/07/2022  -     Lipid Panel; Future; Expected date: 02/07/2022  -     TSH; Future; Expected date: 02/07/2022    Need for influenza vaccination  -     Influenza - Quadrivalent *Preferred* (6 months+) (PF)    This note was created using [employer] voice recognition software that occasionally misinterprets phrases or words.     Follow up in about 3 months (around 5/7/2022) for Dyslipidemia.      2/7/2022 KATIUSKA Ramires, FNP

## 2022-02-07 NOTE — PATIENT INSTRUCTIONS
"Patient Education       Low Cholesterol, Saturated Fat, and Trans Fat Diet   About this topic   Cholesterol, saturated fat, and trans fat are in many foods. These may raise your blood cholesterol levels. If your cholesterol is too high, this can cause health problems in your heart, liver, kidneys, and even your eyes. The key to lowering your risk of heart problems is to lower your bad fat intake.  Saturated fats and trans fats are the bad fats. These fats clog your arteries and raise your bad cholesterol. Saturated fats and trans fats are solid fats at room temperature. Saturated fats are animal fats. Trans fats are manmade fats. They add flavor to a lot of packaged foods. Staying away from saturated and trans fats will help your heart.  When you do eat foods with fat, make sure they have the good fats. Monounsaturated and polyunsaturated fats are good fats. These fats help raise your good cholesterol and protect your heart.  General   How to Lower Fat and Cholesterol in Your Diet   · Read the labels of the foods you buy from the market to find out how much fat is present. Under 5% of total fat on a label means it is "low fat". Over 20% of total fat on a label means it is high fat.  · Eat high fiber foods, like soluble fiber. This type of fiber helps lower cholesterol in the body. Choose oatmeal, fruits (like apples), beans, and nuts to get the most soluble fiber.  · Eat foods high in omega-3 fatty acids like keya seeds, walnuts, salmon, tuna, trout, herring, flaxseed, and soybeans. These foods help keep the heart healthy.  · Limit your bad fat and oil intake.  · Stay away from butter, stick margarine, shortening, lard, and palm and coconut oil. Pick plant-based spreads instead.  · Limit mayonnaise, salad dressings, gravies, and sauces, unless it is made from low-fat ingredients.  · Limit chocolate.  · Do not eat high-fat processed foods like hot dogs, mckeon, sausage, ham and other luncheon meats high in fat, and " some frozen foods. Pick fish, chicken, turkey, and lean meats instead.  · Eat more dried beans, lentils, and tofu to get your protein.  · Do not eat organ meats, like liver.  · Choose nonfat or low-fat milk, yogurt, and cheese.  · Use light or fat-free cream cheese and sour cream.  · Eat lots of fruits and vegetables.  · Pick whole grain breads, cereals, pastas, and rice.  · Do not eat snacks that are high in fats like granola, cookies, pies, pastries, doughnuts, and croissants.  · Stay away from deep fried foods.  Help When Cooking   · Remove the fat portion of meats and the skin from poultry before cooking.  · Bake, broil, grill, poach, or roast poultry, fish, and lean meats.  · Drain and throw away the fat that drains out of meat as you cook it.  · Try to add little or no fat to foods.  · Use olive or canola oil for cooking or baking.  · Steam your vegetables.  · Use herbs or no-oil marinades to flavor foods.         Who should use this diet?   This diet is for people who are at high risk of getting health problems like heart disease, high blood pressure, diabetes, and others. This diet is also good for all people to follow to keep your heart healthy.  What foods are good to eat?   Foods with good fats are:  · Canola, peanut, and olive oil  · Safflower, soybean, and corn oil  · Walnuts, almonds, cashews, and peanuts  · Pumpkin and sunflower seeds  · [place] and tuna  · Tofu  · Soymilk  · Avocado  What foods should be limited or avoided?   Stay away from these types of foods that have saturated fats:  · Whole fat dairy products like cheese, ice cream, whole milk, and cream  · Palm and coconut oils  · High-fat meats like beef, lamb, poultry with the skin, mckeon, and sausage  · Butter and lard  Stay away from these types of foods that may have trans fat:  · Cookies, cakes, candy, doughnuts, baked goods, muffins, pizza dough, and pie crusts that are packaged  · Fried foods  · Frozen dinners  · Chips and  crackers  · Microwave popcorn  · Stick margarine and vegetable shortenings  Helpful tips   To help stay away from saturated fat:  · Pick lean cuts of meat  · Take the skin off chicken and turkey or pick skinless  · Pick low-fat cheese, milk, and ice cream  · Use liquid oils when cooking and baking, such as olive oil and canola oil  To help stay away from trans fat:  · Look at your labels. Choose foods with 0% trans fat. Read the ingredient list. Avoid foods with partially hydrogenated oil in the ingredient list. This means there is trans fat in the product.  Where can I learn more?   American Heart Association   http://www.heart.org/HEARTORG/Conditions/Cholesterol/PreventionTreatmentofHighCholesterol/Know-Your-Fats_[facility]_305628_Article.jsp   Last Reviewed Date   2021-10-05  Consumer Information Use and Disclaimer   This information is not specific medical advice and does not replace information you receive from your health care provider. This is only a brief summary of general information. It does NOT include all information about conditions, illnesses, injuries, tests, procedures, treatments, therapies, discharge instructions or life-style choices that may apply to you. You must talk with your health care provider for complete information about your health and treatment options. This information should not be used to decide whether or not to accept your health care providers advice, instructions or recommendations. Only your health care provider has the knowledge and training to provide advice that is right for you.   Copyright   Copyright © 2021 [employer] and its affiliates and/or licensors. All rights reserved.

## 2022-05-10 ENCOUNTER — OFFICE VISIT (OUTPATIENT)
Dept: FAMILY MEDICINE | Facility: CLINIC | Age: 32
End: 2022-05-10
Payer: MEDICAID

## 2022-05-10 VITALS
HEART RATE: 103 BPM | SYSTOLIC BLOOD PRESSURE: 124 MMHG | HEIGHT: 73 IN | TEMPERATURE: 98 F | DIASTOLIC BLOOD PRESSURE: 78 MMHG | OXYGEN SATURATION: 98 % | BODY MASS INDEX: 34.44 KG/M2 | WEIGHT: 259.88 LBS

## 2022-05-10 DIAGNOSIS — F90.9 ATTENTION DEFICIT HYPERACTIVITY DISORDER (ADHD), UNSPECIFIED ADHD TYPE: Primary | ICD-10-CM

## 2022-05-10 DIAGNOSIS — E78.5 DYSLIPIDEMIA: ICD-10-CM

## 2022-05-10 PROBLEM — G47.00 INSOMNIA: Status: RESOLVED | Noted: 2021-04-28 | Resolved: 2022-05-10

## 2022-05-10 PROCEDURE — 1159F MED LIST DOCD IN RCRD: CPT | Mod: CPTII,,, | Performed by: NURSE PRACTITIONER

## 2022-05-10 PROCEDURE — 99214 OFFICE O/P EST MOD 30 MIN: CPT | Mod: S$PBB,,, | Performed by: NURSE PRACTITIONER

## 2022-05-10 PROCEDURE — 3008F PR BODY MASS INDEX (BMI) DOCUMENTED: ICD-10-PCS | Mod: CPTII,,, | Performed by: NURSE PRACTITIONER

## 2022-05-10 PROCEDURE — 3078F PR MOST RECENT DIASTOLIC BLOOD PRESSURE < 80 MM HG: ICD-10-PCS | Mod: CPTII,,, | Performed by: NURSE PRACTITIONER

## 2022-05-10 PROCEDURE — 99214 OFFICE O/P EST MOD 30 MIN: CPT | Performed by: NURSE PRACTITIONER

## 2022-05-10 PROCEDURE — 3074F PR MOST RECENT SYSTOLIC BLOOD PRESSURE < 130 MM HG: ICD-10-PCS | Mod: CPTII,,, | Performed by: NURSE PRACTITIONER

## 2022-05-10 PROCEDURE — 99214 PR OFFICE/OUTPT VISIT, EST, LEVL IV, 30-39 MIN: ICD-10-PCS | Mod: S$PBB,,, | Performed by: NURSE PRACTITIONER

## 2022-05-10 PROCEDURE — 3008F BODY MASS INDEX DOCD: CPT | Mod: CPTII,,, | Performed by: NURSE PRACTITIONER

## 2022-05-10 PROCEDURE — 3074F SYST BP LT 130 MM HG: CPT | Mod: CPTII,,, | Performed by: NURSE PRACTITIONER

## 2022-05-10 PROCEDURE — 3078F DIAST BP <80 MM HG: CPT | Mod: CPTII,,, | Performed by: NURSE PRACTITIONER

## 2022-05-10 PROCEDURE — 1159F PR MEDICATION LIST DOCUMENTED IN MEDICAL RECORD: ICD-10-PCS | Mod: CPTII,,, | Performed by: NURSE PRACTITIONER

## 2022-05-10 RX ORDER — DEXTROAMPHETAMINE SACCHARATE, AMPHETAMINE ASPARTATE MONOHYDRATE, DEXTROAMPHETAMINE SULFATE AND AMPHETAMINE SULFATE 5; 5; 5; 5 MG/1; MG/1; MG/1; MG/1
20 CAPSULE, EXTENDED RELEASE ORAL EVERY MORNING
Qty: 30 CAPSULE | Refills: 0 | Status: SHIPPED | OUTPATIENT
Start: 2022-05-10 | End: 2022-10-18 | Stop reason: SDUPTHER

## 2022-05-10 NOTE — PROGRESS NOTES
SUBJECTIVE:      Patient ID: Adam Nettles is a 32 y.o. male.    Chief Complaint: Dyslipidemia (3 month f/u) and ADHD (Med refill, hasn't had meds in a awhile.)    32-year-old male presents to the clinic for dyslipidemia and ADHD follow-up.      Labs were ordered at the last visit, but not completed.  Last .8.  He was prescribed Lipitor at that time by his former PCP, but never go the prescription filled.  He continues to gain weight. He started exercising 3 weeks ago.  He swims or rides a stationary bike at the gym 4 days per week x30 min.      His Adderall is no longer being managed by [facility].  There was and issue with his insurance and is unable to get an appointment.  He has been out of his medication since February.  He works as a  at night and needs to medication to stay focused.  Currently on Adderall 20 mg daily.  Patient reports the medications wears off after about 4-5 hours.  He is sleeping well.  Denies chest pain and palpitations.    Hyperlipidemia  This is a chronic problem. The current episode started more than 1 month ago. The problem is uncontrolled. Recent lipid tests were reviewed and are high. Exacerbating diseases include obesity. He has no history of chronic renal disease, diabetes, hypothyroidism, liver disease or nephrotic syndrome. Factors aggravating his hyperlipidemia include fatty foods. Pertinent negatives include no chest pain, focal sensory loss, focal weakness, leg pain, myalgias or shortness of breath. Current antihyperlipidemic treatment includes diet change and exercise. Improvement on treatment: Labs pending. Risk factors for coronary artery disease include dyslipidemia, male sex and obesity.       Family History   Problem Relation Age of Onset    Pancreatic cancer Mother     Cancer Mother     Stroke Father     No Known Problems Sister     No Known Problems Brother       Social History     Socioeconomic History    Marital status: Single     Number of children: 0   Occupational History    Occupation:    Tobacco Use    Smoking status: Never Smoker    Smokeless tobacco: Never Used   Substance and Sexual Activity    Alcohol use: Not Currently    Drug use: Never    Sexual activity: Not Currently     Current Outpatient Medications   Medication Sig Dispense Refill    dextroamphetamine-amphetamine (ADDERALL XR) 20 MG 24 hr capsule Take 1 capsule (20 mg total) by mouth every morning. 30 capsule 0     No current facility-administered medications for this visit.     Review of patient's allergies indicates:  No Known Allergies   Past Medical History:   Diagnosis Date    ADHD     ADHD (attention deficit hyperactivity disorder)     Panic attacks 2015     History reviewed. No pertinent surgical history.    Review of Systems   Constitutional: Negative for activity change, appetite change, chills, diaphoresis, fatigue, fever and unexpected weight change.   HENT: Negative for congestion, ear pain, sinus pressure, sore throat, trouble swallowing and voice change.    Eyes: Negative for pain, discharge and visual disturbance.   Respiratory: Negative for cough, chest tightness, shortness of breath and wheezing.    Cardiovascular: Negative for chest pain and palpitations.   Gastrointestinal: Negative for abdominal pain, constipation, diarrhea, nausea and vomiting.   Genitourinary: Negative for difficulty urinating, flank pain, frequency and urgency.   Musculoskeletal: Negative for back pain, joint swelling and myalgias.   Skin: Negative for color change and rash.   Neurological: Negative for dizziness, focal weakness, seizures, syncope, weakness, numbness and headaches.   Hematological: Negative for adenopathy.   Psychiatric/Behavioral: Positive for decreased concentration. Negative for dysphoric mood and sleep disturbance. The patient is not nervous/anxious.       OBJECTIVE:      Vitals:    05/10/22 1436   BP: 124/78   BP Location: Left arm  "  Patient Position: Sitting   BP Method: Medium (Manual)   Pulse: 103   Temp: 98.2 °F (36.8 °C)   TempSrc: Oral   SpO2: 98%   Weight: 117.9 kg (259 lb 14.4 oz)   Height: 6' 1" (1.854 m)     Physical Exam  Vitals and nursing note reviewed.   Constitutional:       General: He is awake. He is not in acute distress.     Appearance: Normal appearance. He is obese. He is not ill-appearing, toxic-appearing or diaphoretic.   HENT:      Head: Normocephalic and atraumatic.      Nose: Nose normal.   Eyes:      General: Lids are normal. Gaze aligned appropriately.      Conjunctiva/sclera: Conjunctivae normal.      Right eye: Right conjunctiva is not injected.      Left eye: Left conjunctiva is not injected.      Pupils: Pupils are equal, round, and reactive to light.   Cardiovascular:      Rate and Rhythm: Normal rate and regular rhythm.      Pulses: Normal pulses.      Heart sounds: Normal heart sounds, S1 normal and S2 normal. No murmur heard.    No friction rub. No gallop.   Pulmonary:      Effort: Pulmonary effort is normal. No respiratory distress.      Breath sounds: Normal breath sounds. No stridor. No decreased breath sounds, wheezing, rhonchi or rales.   Chest:      Chest wall: No tenderness.   Musculoskeletal:      Cervical back: Neck supple.      Right lower leg: No edema.      Left lower leg: No edema.   Lymphadenopathy:      Cervical: No cervical adenopathy.   Skin:     General: Skin is warm and dry.      Capillary Refill: Capillary refill takes less than 2 seconds.      Findings: No erythema or rash.   Neurological:      Mental Status: He is alert and oriented to person, place, and time. Mental status is at baseline.   Psychiatric:         Attention and Perception: Attention normal.         Mood and Affect: Mood normal.         Speech: Speech normal.         Behavior: Behavior normal. Behavior is cooperative.         Thought Content: Thought content normal.         Judgment: Judgment normal.        Assessment:     "   1. Attention deficit hyperactivity disorder (ADHD), unspecified ADHD type    2. Dyslipidemia        Plan:       Attention deficit hyperactivity disorder (ADHD), unspecified ADHD type  Patient switch to XR due to medication wearing off.  Notify clinic if there are any adverse effects.  Follow-up in 3 months.  -     dextroamphetamine-amphetamine (ADDERALL XR) 20 MG 24 hr capsule; Take 1 capsule (20 mg total) by mouth every morning.  Dispense: 30 capsule; Refill: 0    Dyslipidemia  Patient will likely need to start taking Lipitor.  Lipid panel pending. Limit red meat, butter, fried foods, cheese, and other foods that have a lot of saturated fat. Consume more: lean meats, fish, fruits, vegetables, whole grains, beans, lentils, and nuts.    This note was created using [employer] voice recognition software that occasionally misinterprets phrases or words.     Follow up in about 3 months (around 8/10/2022) for ADHD.      5/10/2022 KATIUSKA Ramires, SCOTTYP

## 2022-10-18 ENCOUNTER — OFFICE VISIT (OUTPATIENT)
Dept: FAMILY MEDICINE | Facility: CLINIC | Age: 32
End: 2022-10-18
Payer: MEDICAID

## 2022-10-18 VITALS
OXYGEN SATURATION: 97 % | WEIGHT: 257.38 LBS | HEART RATE: 79 BPM | BODY MASS INDEX: 34.11 KG/M2 | SYSTOLIC BLOOD PRESSURE: 132 MMHG | TEMPERATURE: 98 F | HEIGHT: 73 IN | DIASTOLIC BLOOD PRESSURE: 80 MMHG

## 2022-10-18 DIAGNOSIS — F90.9 ATTENTION DEFICIT HYPERACTIVITY DISORDER (ADHD), UNSPECIFIED ADHD TYPE: Primary | ICD-10-CM

## 2022-10-18 DIAGNOSIS — E78.5 DYSLIPIDEMIA: ICD-10-CM

## 2022-10-18 DIAGNOSIS — N47.1 PHIMOSIS OF PENIS: ICD-10-CM

## 2022-10-18 PROCEDURE — 1160F PR REVIEW ALL MEDS BY PRESCRIBER/CLIN PHARMACIST DOCUMENTED: ICD-10-PCS | Mod: CPTII,,, | Performed by: NURSE PRACTITIONER

## 2022-10-18 PROCEDURE — 3079F PR MOST RECENT DIASTOLIC BLOOD PRESSURE 80-89 MM HG: ICD-10-PCS | Mod: CPTII,,, | Performed by: NURSE PRACTITIONER

## 2022-10-18 PROCEDURE — 1160F RVW MEDS BY RX/DR IN RCRD: CPT | Mod: CPTII,,, | Performed by: NURSE PRACTITIONER

## 2022-10-18 PROCEDURE — 99214 OFFICE O/P EST MOD 30 MIN: CPT | Performed by: NURSE PRACTITIONER

## 2022-10-18 PROCEDURE — 99214 PR OFFICE/OUTPT VISIT, EST, LEVL IV, 30-39 MIN: ICD-10-PCS | Mod: S$PBB,,, | Performed by: NURSE PRACTITIONER

## 2022-10-18 PROCEDURE — 3079F DIAST BP 80-89 MM HG: CPT | Mod: CPTII,,, | Performed by: NURSE PRACTITIONER

## 2022-10-18 PROCEDURE — 3075F PR MOST RECENT SYSTOLIC BLOOD PRESS GE 130-139MM HG: ICD-10-PCS | Mod: CPTII,,, | Performed by: NURSE PRACTITIONER

## 2022-10-18 PROCEDURE — 99214 OFFICE O/P EST MOD 30 MIN: CPT | Mod: S$PBB,,, | Performed by: NURSE PRACTITIONER

## 2022-10-18 PROCEDURE — 1159F MED LIST DOCD IN RCRD: CPT | Mod: CPTII,,, | Performed by: NURSE PRACTITIONER

## 2022-10-18 PROCEDURE — 1159F PR MEDICATION LIST DOCUMENTED IN MEDICAL RECORD: ICD-10-PCS | Mod: CPTII,,, | Performed by: NURSE PRACTITIONER

## 2022-10-18 PROCEDURE — 3075F SYST BP GE 130 - 139MM HG: CPT | Mod: CPTII,,, | Performed by: NURSE PRACTITIONER

## 2022-10-18 RX ORDER — DEXTROAMPHETAMINE SACCHARATE, AMPHETAMINE ASPARTATE MONOHYDRATE, DEXTROAMPHETAMINE SULFATE AND AMPHETAMINE SULFATE 5; 5; 5; 5 MG/1; MG/1; MG/1; MG/1
20 CAPSULE, EXTENDED RELEASE ORAL EVERY MORNING
Qty: 30 CAPSULE | Refills: 0 | Status: SHIPPED | OUTPATIENT
Start: 2022-10-18 | End: 2023-04-28 | Stop reason: SDUPTHER

## 2022-10-18 RX ORDER — BETAMETHASONE DIPROPIONATE 0.5 MG/G
CREAM TOPICAL 2 TIMES DAILY
Qty: 45 G | Refills: 1 | Status: SHIPPED | OUTPATIENT
Start: 2022-10-18

## 2022-10-18 NOTE — PROGRESS NOTES
SUBJECTIVE:      Patient ID: Adam Nettles is a 32 y.o. male.    Chief Complaint: Follow-up    32-year-old male presents to the clinic for dyslipidemia and ADHD follow-up.      Labs were ordered at the last visit, but not completed.  Last .8.  He was prescribed Lipitor at that time by his former PCP, but never got the prescription filled.  He continues to gain weight. He is no longer exercising.     Currently on Adderall XR 20 mg daily. Doing well at the current dose, but has not filled the medication in a while. He is sleeping well.  Mood is stable. Denies chest pain and palpitations.    Unable to pull back foreskin.  This has been ongoing for a while.  He is uncircumcised.  When he tries to retract the foreskin it causes pain.  Denies trauma, rash, lesions, erythema, penis discharge, and UTIs.      Hyperlipidemia  This is a chronic problem. The current episode started more than 1 month ago. The problem is uncontrolled. Recent lipid tests were reviewed and are high. Exacerbating diseases include obesity. He has no history of chronic renal disease, diabetes, hypothyroidism, liver disease or nephrotic syndrome. Factors aggravating his hyperlipidemia include fatty foods. Pertinent negatives include no chest pain, focal sensory loss, focal weakness, leg pain, myalgias or shortness of breath. Current antihyperlipidemic treatment includes diet change and exercise. Improvement on treatment: Labs pending. Risk factors for coronary artery disease include dyslipidemia, male sex and obesity.     Family History   Problem Relation Age of Onset    Pancreatic cancer Mother     Cancer Mother     Stroke Father     No Known Problems Sister     No Known Problems Brother       Social History     Socioeconomic History    Marital status: Single    Number of children: 0   Occupational History    Occupation:    Tobacco Use    Smoking status: Never    Smokeless tobacco: Never   Substance and Sexual Activity     Alcohol use: Not Currently    Drug use: Never    Sexual activity: Not Currently     Current Outpatient Medications   Medication Sig Dispense Refill    betamethasone dipropionate 0.05 % cream Apply topically 2 (two) times daily. Apply directly on and around the phimotic ring. 45 g 1    dextroamphetamine-amphetamine (ADDERALL XR) 20 MG 24 hr capsule Take 1 capsule (20 mg total) by mouth every morning. 30 capsule 0     No current facility-administered medications for this visit.     Review of patient's allergies indicates:  No Known Allergies   Past Medical History:   Diagnosis Date    ADHD     ADHD (attention deficit hyperactivity disorder)     Insomnia 4/28/2021    Panic attacks 2015     No past surgical history on file.    Review of Systems   Constitutional:  Negative for activity change, appetite change, chills, diaphoresis, fatigue, fever and unexpected weight change.   HENT:  Negative for congestion, ear pain, sinus pressure, sore throat, trouble swallowing and voice change.    Eyes:  Negative for pain, discharge and visual disturbance.   Respiratory:  Negative for cough, chest tightness, shortness of breath and wheezing.    Cardiovascular:  Negative for chest pain and palpitations.   Gastrointestinal:  Negative for abdominal pain, constipation, diarrhea, nausea and vomiting.   Genitourinary:  Positive for penile pain. Negative for difficulty urinating, flank pain, frequency, penile swelling, scrotal swelling, testicular pain and urgency.        Phimosis    Musculoskeletal:  Negative for back pain, joint swelling and myalgias.   Skin:  Negative for color change and rash.   Neurological:  Negative for dizziness, focal weakness, seizures, syncope, weakness, numbness and headaches.   Hematological:  Negative for adenopathy.   Psychiatric/Behavioral:  Positive for decreased concentration. Negative for dysphoric mood and sleep disturbance. The patient is not nervous/anxious.     OBJECTIVE:      Vitals:    10/18/22  "1553   BP: 132/80   BP Location: Right arm   Patient Position: Sitting   BP Method: Medium (Manual)   Pulse: 79   Temp: 98 °F (36.7 °C)   TempSrc: Oral   SpO2: 97%   Weight: 116.8 kg (257 lb 6.4 oz)   Height: 6' 1" (1.854 m)     Physical Exam  Vitals and nursing note reviewed.   Constitutional:       General: He is awake. He is not in acute distress.     Appearance: Normal appearance. He is obese. He is not ill-appearing, toxic-appearing or diaphoretic.   HENT:      Head: Normocephalic and atraumatic.      Nose: Nose normal.   Eyes:      General: Lids are normal. Gaze aligned appropriately.      Conjunctiva/sclera: Conjunctivae normal.      Right eye: Right conjunctiva is not injected.      Left eye: Left conjunctiva is not injected.      Pupils: Pupils are equal, round, and reactive to light.   Cardiovascular:      Rate and Rhythm: Normal rate and regular rhythm.      Pulses: Normal pulses.      Heart sounds: Normal heart sounds, S1 normal and S2 normal. No murmur heard.    No friction rub. No gallop.   Pulmonary:      Effort: Pulmonary effort is normal. No respiratory distress.      Breath sounds: Normal breath sounds. No stridor. No decreased breath sounds, wheezing, rhonchi or rales.   Chest:      Chest wall: No tenderness.   Genitourinary:     Penis: Uncircumcised. Phimosis present.    Musculoskeletal:      Cervical back: Neck supple.      Right lower leg: No edema.      Left lower leg: No edema.   Lymphadenopathy:      Cervical: No cervical adenopathy.   Skin:     General: Skin is warm and dry.      Capillary Refill: Capillary refill takes less than 2 seconds.      Findings: No erythema or rash.   Neurological:      Mental Status: He is alert and oriented to person, place, and time. Mental status is at baseline.   Psychiatric:         Attention and Perception: Attention normal.         Mood and Affect: Mood normal.         Speech: Speech normal.         Behavior: Behavior normal. Behavior is cooperative.       "   Thought Content: Thought content normal.         Judgment: Judgment normal.      Assessment:       1. Attention deficit hyperactivity disorder (ADHD), unspecified ADHD type    2. Phimosis of penis    3. Dyslipidemia        Plan:       Attention deficit hyperactivity disorder (ADHD), unspecified ADHD type  Stable, continue current treatment.   -     dextroamphetamine-amphetamine (ADDERALL XR) 20 MG 24 hr capsule; Take 1 capsule (20 mg total) by mouth every morning.  Dispense: 30 capsule; Refill: 0    Phimosis of penis  Will have patient evaluated by Urology.  Up to date recommends a trial of betamethasone cream bid with stretching exercises (gently retracting the foreskin) several times per day. Discussed complications such as paraphimosis, which is a medical emergency and if this happens he needs to go directly to the ER.   -     Ambulatory referral/consult to Urology; Future; Expected date: 10/25/2022  -     betamethasone dipropionate 0.05 % cream; Apply topically 2 (two) times daily. Apply directly on and around the phimotic ring.  Dispense: 45 g; Refill: 1    Dyslipidemia  Get labs completed ASAP.  Will likely need to restart Lipitor. Limit red meat, butter, fried foods, cheese, and other foods that have a lot of saturated fat. Consume more: lean meats, fish, fruits, vegetables, whole grains, beans, lentils, and nuts.      This note was created using [employer] voice recognition software that occasionally misinterprets phrases or words.     Follow up in about 3 months (around 1/18/2023) for ADHD.      10/18/2022 KATIUSKA Ramires, FNP

## 2023-04-28 ENCOUNTER — OFFICE VISIT (OUTPATIENT)
Dept: FAMILY MEDICINE | Facility: CLINIC | Age: 33
End: 2023-04-28
Payer: MEDICAID

## 2023-04-28 VITALS
OXYGEN SATURATION: 99 % | TEMPERATURE: 98 F | DIASTOLIC BLOOD PRESSURE: 84 MMHG | HEIGHT: 73 IN | HEART RATE: 84 BPM | WEIGHT: 266.69 LBS | BODY MASS INDEX: 35.35 KG/M2 | SYSTOLIC BLOOD PRESSURE: 134 MMHG

## 2023-04-28 DIAGNOSIS — R73.9 HYPERGLYCEMIA: ICD-10-CM

## 2023-04-28 DIAGNOSIS — E66.01 CLASS 2 SEVERE OBESITY DUE TO EXCESS CALORIES WITH SERIOUS COMORBIDITY AND BODY MASS INDEX (BMI) OF 35.0 TO 35.9 IN ADULT: ICD-10-CM

## 2023-04-28 DIAGNOSIS — M54.6 ACUTE MIDLINE THORACIC BACK PAIN: ICD-10-CM

## 2023-04-28 DIAGNOSIS — F90.9 ATTENTION DEFICIT HYPERACTIVITY DISORDER (ADHD), UNSPECIFIED ADHD TYPE: Primary | ICD-10-CM

## 2023-04-28 DIAGNOSIS — Z00.00 HEALTHCARE MAINTENANCE: ICD-10-CM

## 2023-04-28 DIAGNOSIS — E78.5 DYSLIPIDEMIA: ICD-10-CM

## 2023-04-28 PROCEDURE — 99214 OFFICE O/P EST MOD 30 MIN: CPT | Performed by: NURSE PRACTITIONER

## 2023-04-28 PROCEDURE — 3075F SYST BP GE 130 - 139MM HG: CPT | Mod: CPTII,,, | Performed by: NURSE PRACTITIONER

## 2023-04-28 PROCEDURE — 3008F PR BODY MASS INDEX (BMI) DOCUMENTED: ICD-10-PCS | Mod: CPTII,,, | Performed by: NURSE PRACTITIONER

## 2023-04-28 PROCEDURE — 3079F DIAST BP 80-89 MM HG: CPT | Mod: CPTII,,, | Performed by: NURSE PRACTITIONER

## 2023-04-28 PROCEDURE — 1160F RVW MEDS BY RX/DR IN RCRD: CPT | Mod: CPTII,,, | Performed by: NURSE PRACTITIONER

## 2023-04-28 PROCEDURE — 1159F PR MEDICATION LIST DOCUMENTED IN MEDICAL RECORD: ICD-10-PCS | Mod: CPTII,,, | Performed by: NURSE PRACTITIONER

## 2023-04-28 PROCEDURE — 99214 PR OFFICE/OUTPT VISIT, EST, LEVL IV, 30-39 MIN: ICD-10-PCS | Mod: S$PBB,,, | Performed by: NURSE PRACTITIONER

## 2023-04-28 PROCEDURE — 3079F PR MOST RECENT DIASTOLIC BLOOD PRESSURE 80-89 MM HG: ICD-10-PCS | Mod: CPTII,,, | Performed by: NURSE PRACTITIONER

## 2023-04-28 PROCEDURE — 1160F PR REVIEW ALL MEDS BY PRESCRIBER/CLIN PHARMACIST DOCUMENTED: ICD-10-PCS | Mod: CPTII,,, | Performed by: NURSE PRACTITIONER

## 2023-04-28 PROCEDURE — 3008F BODY MASS INDEX DOCD: CPT | Mod: CPTII,,, | Performed by: NURSE PRACTITIONER

## 2023-04-28 PROCEDURE — 1159F MED LIST DOCD IN RCRD: CPT | Mod: CPTII,,, | Performed by: NURSE PRACTITIONER

## 2023-04-28 PROCEDURE — 99214 OFFICE O/P EST MOD 30 MIN: CPT | Mod: S$PBB,,, | Performed by: NURSE PRACTITIONER

## 2023-04-28 PROCEDURE — 3075F PR MOST RECENT SYSTOLIC BLOOD PRESS GE 130-139MM HG: ICD-10-PCS | Mod: CPTII,,, | Performed by: NURSE PRACTITIONER

## 2023-04-28 RX ORDER — DEXTROAMPHETAMINE SACCHARATE, AMPHETAMINE ASPARTATE MONOHYDRATE, DEXTROAMPHETAMINE SULFATE AND AMPHETAMINE SULFATE 5; 5; 5; 5 MG/1; MG/1; MG/1; MG/1
20 CAPSULE, EXTENDED RELEASE ORAL EVERY MORNING
Qty: 30 CAPSULE | Refills: 0 | Status: SHIPPED | OUTPATIENT
Start: 2023-04-28 | End: 2023-07-28

## 2023-04-28 RX ORDER — CYCLOBENZAPRINE HCL 10 MG
10 TABLET ORAL 3 TIMES DAILY PRN
Qty: 21 TABLET | Refills: 0 | Status: SHIPPED | OUTPATIENT
Start: 2023-04-28

## 2023-04-28 NOTE — PROGRESS NOTES
SUBJECTIVE:      Patient ID: Adam Nettles is a 33 y.o. male.    Chief Complaint: Follow-up and Back Pain    33-year-old male presents to the clinic for ADHD and dyslipidemia follow-up.  He also complains of back pain.      Mid thoracic back pain x1-2 month. Describes as tightness. The pain is nonradiating.  Bending back and arm movements make the pain worse. Denies injury or trauma, numbness/tingling.      Currently on Adderall XR 20 mg daily. Doing well at the current dose, but has not filled the medication in a while. He is sleeping well.  Mood is stable. Denies chest pain and palpitations.    He is overdue for labs. Last .8.  He was prescribed Lipitor by his former PCP, but never started the medication. He continues to gain weight, approx 10 lb in 6 months.  He is no longer exercising.     Hyperlipidemia  This is a chronic problem. The current episode started more than 1 month ago. The problem is uncontrolled. Recent lipid tests were reviewed and are high. Exacerbating diseases include obesity. He has no history of chronic renal disease, diabetes, hypothyroidism, liver disease or nephrotic syndrome. Factors aggravating his hyperlipidemia include fatty foods. Pertinent negatives include no chest pain, focal sensory loss, focal weakness, leg pain, myalgias or shortness of breath. Current antihyperlipidemic treatment includes diet change and exercise. The current treatment provides no improvement (Labs pending) of lipids. Risk factors for coronary artery disease include dyslipidemia, male sex and obesity.     Family History   Problem Relation Age of Onset    Pancreatic cancer Mother     Cancer Mother     Stroke Father     No Known Problems Sister     No Known Problems Brother       Social History     Socioeconomic History    Marital status: Single    Number of children: 0   Occupational History    Occupation:    Tobacco Use    Smoking status: Never    Smokeless tobacco: Never   Substance  and Sexual Activity    Alcohol use: Not Currently    Drug use: Never    Sexual activity: Not Currently     Current Outpatient Medications   Medication Sig Dispense Refill    betamethasone dipropionate 0.05 % cream Apply topically 2 (two) times daily. Apply directly on and around the phimotic ring. 45 g 1    cyclobenzaprine (FLEXERIL) 10 MG tablet Take 1 tablet (10 mg total) by mouth 3 (three) times daily as needed for Muscle spasms. 21 tablet 0    dextroamphetamine-amphetamine (ADDERALL XR) 20 MG 24 hr capsule Take 1 capsule (20 mg total) by mouth every morning. 30 capsule 0     No current facility-administered medications for this visit.     Review of patient's allergies indicates:  No Known Allergies   Past Medical History:   Diagnosis Date    ADHD     ADHD (attention deficit hyperactivity disorder)     Insomnia 4/28/2021    Panic attacks 2015     History reviewed. No pertinent surgical history.    Review of Systems   Constitutional:  Negative for activity change, appetite change, chills, diaphoresis, fatigue, fever and unexpected weight change.   HENT:  Negative for congestion, ear pain, sinus pressure, sore throat, trouble swallowing and voice change.    Eyes:  Negative for pain, discharge and visual disturbance.   Respiratory:  Negative for cough, chest tightness, shortness of breath and wheezing.    Cardiovascular:  Negative for chest pain and palpitations.   Gastrointestinal:  Negative for abdominal pain, constipation, diarrhea, nausea and vomiting.   Genitourinary:  Negative for difficulty urinating, flank pain, frequency and urgency.   Musculoskeletal:  Positive for back pain. Negative for joint swelling and myalgias.   Skin:  Negative for color change and rash.   Neurological:  Negative for dizziness, focal weakness, seizures, syncope, weakness, numbness and headaches.   Hematological:  Negative for adenopathy.   Psychiatric/Behavioral:  Negative for dysphoric mood and sleep disturbance. The patient is not  "nervous/anxious.     OBJECTIVE:      Vitals:    04/28/23 0902   BP: 134/84   BP Location: Left arm   Patient Position: Sitting   BP Method: Medium (Automatic)   Pulse: 84   Temp: 98.3 °F (36.8 °C)   TempSrc: Oral   SpO2: 99%   Weight: 121 kg (266 lb 11.2 oz)   Height: 6' 1" (1.854 m)     Physical Exam  Vitals and nursing note reviewed.   Constitutional:       General: He is awake. He is not in acute distress.     Appearance: Normal appearance. He is obese. He is not ill-appearing, toxic-appearing or diaphoretic.   HENT:      Head: Normocephalic and atraumatic.      Nose: Nose normal.   Eyes:      General: Lids are normal. Gaze aligned appropriately.      Conjunctiva/sclera: Conjunctivae normal.      Right eye: Right conjunctiva is not injected.      Left eye: Left conjunctiva is not injected.      Pupils: Pupils are equal, round, and reactive to light.   Cardiovascular:      Rate and Rhythm: Normal rate and regular rhythm.      Pulses: Normal pulses.      Heart sounds: Normal heart sounds, S1 normal and S2 normal. No murmur heard.    No friction rub. No gallop.   Pulmonary:      Effort: Pulmonary effort is normal. No respiratory distress.      Breath sounds: Normal breath sounds. No stridor. No decreased breath sounds, wheezing, rhonchi or rales.   Chest:      Chest wall: No tenderness.   Musculoskeletal:      Cervical back: Neck supple.      Thoracic back: Tenderness present.        Back:       Right lower leg: No edema.      Left lower leg: No edema.      Comments: Mild tenderness with palpation to bilateral paraspinal muscles. No deformity or palpable step-off noted.   Lymphadenopathy:      Cervical: No cervical adenopathy.   Skin:     General: Skin is warm and dry.      Capillary Refill: Capillary refill takes less than 2 seconds.      Findings: No erythema or rash.   Neurological:      Mental Status: He is alert and oriented to person, place, and time. Mental status is at baseline.   Psychiatric:         " Attention and Perception: Attention normal.         Mood and Affect: Mood normal.         Speech: Speech normal.         Behavior: Behavior normal. Behavior is cooperative.         Thought Content: Thought content normal.         Judgment: Judgment normal.      Assessment:       1. Attention deficit hyperactivity disorder (ADHD), unspecified ADHD type    2. Dyslipidemia    3. Acute midline thoracic back pain    4. Hyperglycemia    5. Healthcare maintenance    6. Class 2 severe obesity due to excess calories with serious comorbidity and body mass index (BMI) of 35.0 to 35.9 in adult        Plan:       Attention deficit hyperactivity disorder (ADHD), unspecified ADHD type  Stable, has not had prescription refills in a while due to shortage.  Doing well at current dosage. Continue Adderall XR 20 mg.  Follow-up in 3 months.   -     dextroamphetamine-amphetamine (ADDERALL XR) 20 MG 24 hr capsule; Take 1 capsule (20 mg total) by mouth every morning.  Dispense: 30 capsule; Refill: 0    Dyslipidemia  Suspect LDL will be >190 and will need to restart Lipitor.  Will wait for lipids to result before prescribing.  He also has fatty liver and needs to make lifestyle adjustments to lose weight.  Limit red meat, butter, fried foods, cheese, and other foods that have a lot of saturated fat. Consume more: lean meats, fish, fruits, vegetables, whole grains, beans, lentils, and nuts.  Weight loss, and 30-45 min of cardiovascular exercise daily.  -     Comprehensive Metabolic Panel; Future; Expected date: 04/28/2023  -     Lipid Panel; Future; Expected date: 04/28/2023  -     TSH; Future; Expected date: 04/28/2023    Acute midline thoracic back pain  Possible muscle strain. Will start patient on Flexeril.  Informed patient it can cause drowsiness and to avoid driving or operating machinery while on the medication.  NSAIDs prn.  Apply heat.  Recommended stretching exercises.   -     cyclobenzaprine (FLEXERIL) 10 MG tablet; Take 1 tablet  (10 mg total) by mouth 3 (three) times daily as needed for Muscle spasms.  Dispense: 21 tablet; Refill: 0    Hyperglycemia  Cut down on the starches, carbohydrates, sugars and high-calorie items like syrups, sweets, cookies, and candies.  -     Comprehensive Metabolic Panel; Future; Expected date: 04/28/2023  -     Hemoglobin A1C; Future; Expected date: 04/28/2023    Healthcare maintenance  -     Comprehensive Metabolic Panel; Future; Expected date: 04/28/2023  -     Lipid Panel; Future; Expected date: 04/28/2023  -     TSH; Future; Expected date: 04/28/2023  -     Hemoglobin A1C; Future; Expected date: 04/28/2023    Class 2 severe obesity due to excess calories with serious comorbidity and body mass index (BMI) of 35.0 to 35.9 in adult  Explained excessive time spent in sedentary behavior, such as sitting, is associated with deleterious health outcomes including abnormal glucose metabolism and increased mortality. Reducing sedentary time, independent of physical activity, has known cardiovascular, metabolic, and functional benefits in all adults. Any amount of exercise is better than being sedentary.    This note was created using [employer] voice recognition software that occasionally misinterprets phrases or words.     I spent a total of 25 minutes on the day of the visit.This includes face to face time and non-face to face time preparing to see the patient (eg, review of tests), obtaining and/or reviewing separately obtained history, documenting clinical information in the electronic or other health record, independently interpreting results and communicating results to the patient/family/caregiver, or care coordinator.    Follow up in about 3 months (around 7/28/2023) for ADHD.      4/28/2023 KATIUSKA Ramires, SCOTTYP

## 2023-07-28 ENCOUNTER — OFFICE VISIT (OUTPATIENT)
Dept: FAMILY MEDICINE | Facility: CLINIC | Age: 33
End: 2023-07-28
Payer: MEDICAID

## 2023-07-28 VITALS
WEIGHT: 264 LBS | DIASTOLIC BLOOD PRESSURE: 76 MMHG | SYSTOLIC BLOOD PRESSURE: 116 MMHG | OXYGEN SATURATION: 98 % | HEART RATE: 80 BPM | HEIGHT: 73 IN | TEMPERATURE: 99 F | BODY MASS INDEX: 34.99 KG/M2

## 2023-07-28 DIAGNOSIS — F90.9 ATTENTION DEFICIT HYPERACTIVITY DISORDER (ADHD), UNSPECIFIED ADHD TYPE: Primary | ICD-10-CM

## 2023-07-28 DIAGNOSIS — E78.2 MIXED HYPERLIPIDEMIA: ICD-10-CM

## 2023-07-28 PROCEDURE — 1160F PR REVIEW ALL MEDS BY PRESCRIBER/CLIN PHARMACIST DOCUMENTED: ICD-10-PCS | Mod: CPTII,,, | Performed by: NURSE PRACTITIONER

## 2023-07-28 PROCEDURE — 3008F PR BODY MASS INDEX (BMI) DOCUMENTED: ICD-10-PCS | Mod: CPTII,,, | Performed by: NURSE PRACTITIONER

## 2023-07-28 PROCEDURE — 99213 PR OFFICE/OUTPT VISIT, EST, LEVL III, 20-29 MIN: ICD-10-PCS | Mod: S$PBB,,, | Performed by: NURSE PRACTITIONER

## 2023-07-28 PROCEDURE — 3074F PR MOST RECENT SYSTOLIC BLOOD PRESSURE < 130 MM HG: ICD-10-PCS | Mod: CPTII,,, | Performed by: NURSE PRACTITIONER

## 2023-07-28 PROCEDURE — 99213 OFFICE O/P EST LOW 20 MIN: CPT | Mod: S$PBB,,, | Performed by: NURSE PRACTITIONER

## 2023-07-28 PROCEDURE — 3008F BODY MASS INDEX DOCD: CPT | Mod: CPTII,,, | Performed by: NURSE PRACTITIONER

## 2023-07-28 PROCEDURE — 1160F RVW MEDS BY RX/DR IN RCRD: CPT | Mod: CPTII,,, | Performed by: NURSE PRACTITIONER

## 2023-07-28 PROCEDURE — 99214 OFFICE O/P EST MOD 30 MIN: CPT | Performed by: NURSE PRACTITIONER

## 2023-07-28 PROCEDURE — 1159F PR MEDICATION LIST DOCUMENTED IN MEDICAL RECORD: ICD-10-PCS | Mod: CPTII,,, | Performed by: NURSE PRACTITIONER

## 2023-07-28 PROCEDURE — 1159F MED LIST DOCD IN RCRD: CPT | Mod: CPTII,,, | Performed by: NURSE PRACTITIONER

## 2023-07-28 PROCEDURE — 3078F PR MOST RECENT DIASTOLIC BLOOD PRESSURE < 80 MM HG: ICD-10-PCS | Mod: CPTII,,, | Performed by: NURSE PRACTITIONER

## 2023-07-28 PROCEDURE — 3074F SYST BP LT 130 MM HG: CPT | Mod: CPTII,,, | Performed by: NURSE PRACTITIONER

## 2023-07-28 PROCEDURE — 3078F DIAST BP <80 MM HG: CPT | Mod: CPTII,,, | Performed by: NURSE PRACTITIONER

## 2023-07-28 RX ORDER — DEXTROAMPHETAMINE SACCHARATE, AMPHETAMINE ASPARTATE MONOHYDRATE, DEXTROAMPHETAMINE SULFATE AND AMPHETAMINE SULFATE 5; 5; 5; 5 MG/1; MG/1; MG/1; MG/1
20 CAPSULE, EXTENDED RELEASE ORAL EVERY MORNING
Qty: 30 CAPSULE | Refills: 0 | Status: SHIPPED | OUTPATIENT
Start: 2023-07-28 | End: 2023-11-01 | Stop reason: SDUPTHER

## 2023-07-28 NOTE — PROGRESS NOTES
SUBJECTIVE:      Patient ID: Adam Nettles is a 33 y.o. male.    Chief Complaint: Follow-up and ADHD    33-year-old male presents to the clinic for ADHD and dyslipidemia follow-up.  Labs were ordered at the last office visit, but not completed.    Currently on Adderall XR 20 mg daily. He does well at the current dose, but has not filled the medication in a while. He is sleeping well.  Mood is stable. Denies chest pain and palpitations.    He is overdue for labs. Last .8.  He was prescribed Lipitor by his former PCP, but never started the medication.     Hyperlipidemia  This is a chronic problem. The current episode started more than 1 month ago. The problem is uncontrolled. Recent lipid tests were reviewed and are high. Exacerbating diseases include obesity. He has no history of chronic renal disease, diabetes, hypothyroidism, liver disease or nephrotic syndrome. Factors aggravating his hyperlipidemia include fatty foods. Pertinent negatives include no chest pain, focal sensory loss, focal weakness, leg pain, myalgias or shortness of breath. Current antihyperlipidemic treatment includes diet change and exercise. The current treatment provides no improvement (Labs pending) of lipids. Risk factors for coronary artery disease include dyslipidemia, male sex and obesity.     Family History   Problem Relation Age of Onset    Pancreatic cancer Mother     Cancer Mother     Stroke Father     No Known Problems Sister     No Known Problems Brother       Social History     Socioeconomic History    Marital status: Single    Number of children: 0   Occupational History    Occupation:    Tobacco Use    Smoking status: Never    Smokeless tobacco: Never   Substance and Sexual Activity    Alcohol use: Not Currently    Drug use: Never    Sexual activity: Not Currently     Current Outpatient Medications   Medication Sig Dispense Refill    betamethasone dipropionate 0.05 % cream Apply topically 2 (two) times  daily. Apply directly on and around the phimotic ring. 45 g 1    cyclobenzaprine (FLEXERIL) 10 MG tablet Take 1 tablet (10 mg total) by mouth 3 (three) times daily as needed for Muscle spasms. 21 tablet 0    dextroamphetamine-amphetamine (ADDERALL XR) 20 MG 24 hr capsule Take 1 capsule (20 mg total) by mouth every morning. 30 capsule 0     No current facility-administered medications for this visit.     Review of patient's allergies indicates:  No Known Allergies   Past Medical History:   Diagnosis Date    ADHD     ADHD (attention deficit hyperactivity disorder)     Insomnia 4/28/2021    Panic attacks 2015     History reviewed. No pertinent surgical history.    Review of Systems   Constitutional:  Negative for activity change, appetite change, chills, diaphoresis, fatigue, fever and unexpected weight change.   HENT:  Negative for congestion, ear pain, sinus pressure, sore throat, trouble swallowing and voice change.    Eyes:  Negative for pain, discharge and visual disturbance.   Respiratory:  Negative for cough, chest tightness, shortness of breath and wheezing.    Cardiovascular:  Negative for chest pain and palpitations.   Gastrointestinal:  Negative for abdominal pain, constipation, diarrhea, nausea and vomiting.   Genitourinary:  Negative for difficulty urinating, flank pain, frequency and urgency.   Musculoskeletal:  Negative for back pain, joint swelling and myalgias.   Skin:  Negative for color change and rash.   Neurological:  Negative for dizziness, focal weakness, seizures, syncope, weakness, numbness and headaches.   Hematological:  Negative for adenopathy.   Psychiatric/Behavioral:  Negative for dysphoric mood and sleep disturbance. The patient is not nervous/anxious.     OBJECTIVE:      Vitals:    07/28/23 0918   BP: 116/76   BP Location: Left arm   Patient Position: Sitting   BP Method: Medium (Manual)   Pulse: 80   Temp: 98.6 °F (37 °C)   TempSrc: Oral   SpO2: 98%   Weight: 119.7 kg (264 lb)  "  Height: 6' 1" (1.854 m)     Physical Exam  Vitals and nursing note reviewed.   Constitutional:       General: He is awake. He is not in acute distress.     Appearance: Normal appearance. He is morbidly obese. He is not ill-appearing, toxic-appearing or diaphoretic.   HENT:      Head: Normocephalic and atraumatic.      Nose: Nose normal.   Eyes:      General: Lids are normal. Gaze aligned appropriately.      Conjunctiva/sclera: Conjunctivae normal.      Right eye: Right conjunctiva is not injected.      Left eye: Left conjunctiva is not injected.      Pupils: Pupils are equal, round, and reactive to light.   Cardiovascular:      Rate and Rhythm: Normal rate and regular rhythm.      Pulses: Normal pulses.      Heart sounds: Normal heart sounds, S1 normal and S2 normal. No murmur heard.    No friction rub. No gallop.   Pulmonary:      Effort: Pulmonary effort is normal. No respiratory distress.      Breath sounds: Normal breath sounds. No stridor. No decreased breath sounds, wheezing, rhonchi or rales.   Chest:      Chest wall: No tenderness.   Musculoskeletal:      Cervical back: Neck supple.      Right lower leg: No edema.      Left lower leg: No edema.   Lymphadenopathy:      Cervical: No cervical adenopathy.   Skin:     General: Skin is warm and dry.      Capillary Refill: Capillary refill takes less than 2 seconds.      Findings: No erythema or rash.   Neurological:      Mental Status: He is alert and oriented to person, place, and time. Mental status is at baseline.   Psychiatric:         Attention and Perception: Attention normal.         Mood and Affect: Mood normal.         Speech: Speech normal.         Behavior: Behavior normal. Behavior is cooperative.         Thought Content: Thought content normal.         Judgment: Judgment normal.      Assessment:       1. Attention deficit hyperactivity disorder (ADHD), unspecified ADHD type    2. Mixed hyperlipidemia        Plan:       Attention deficit " hyperactivity disorder (ADHD), unspecified ADHD type  Stable, continue Adderall XR 20 mg.  Follow-up every three months as scheduled.   -     dextroamphetamine-amphetamine (ADDERALL XR) 20 MG 24 hr capsule; Take 1 capsule (20 mg total) by mouth every morning.  Dispense: 30 capsule; Refill: 0    Mixed hyperlipidemia  Suspect he will need to start a cholesterol medication, LDL previously >190 and has not made any diet adjustments. Patient is fasting and instructed to get labs completed today. Limit red meat, butter, fried foods, cheese, and other foods that have a lot of saturated fat. Consume more: lean meats, fish, fruits, vegetables, whole grains, beans, lentils, and nuts.  Weight loss, and 30-45 min of cardiovascular exercise daily.    This note was created using [employer] voice recognition software that occasionally misinterprets phrases or words.     I spent a total of 20 minutes on the day of the visit.This includes face to face time and non-face to face time preparing to see the patient (eg, review of tests), obtaining and/or reviewing separately obtained history, documenting clinical information in the electronic or other health record, independently interpreting results and communicating results to the patient/family/caregiver, or care coordinator.    Follow up in about 3 months (around 10/28/2023) for ADHD.      7/28/2023 KATIUSKA Ramires, ASHVIN

## 2023-10-25 ENCOUNTER — LAB VISIT (OUTPATIENT)
Dept: LAB | Facility: HOSPITAL | Age: 33
End: 2023-10-25
Attending: NURSE PRACTITIONER
Payer: MEDICAID

## 2023-10-25 DIAGNOSIS — R73.9 HYPERGLYCEMIA: ICD-10-CM

## 2023-10-25 DIAGNOSIS — Z00.00 HEALTHCARE MAINTENANCE: ICD-10-CM

## 2023-10-25 DIAGNOSIS — E78.5 DYSLIPIDEMIA: ICD-10-CM

## 2023-10-25 LAB
ALBUMIN SERPL BCP-MCNC: 4.5 G/DL (ref 3.5–5.2)
ALP SERPL-CCNC: 104 U/L (ref 55–135)
ALT SERPL W/O P-5'-P-CCNC: 65 U/L (ref 10–44)
ANION GAP SERPL CALC-SCNC: 12 MMOL/L (ref 8–16)
AST SERPL-CCNC: 53 U/L (ref 10–40)
BILIRUB SERPL-MCNC: 0.4 MG/DL (ref 0.1–1)
BUN SERPL-MCNC: 13 MG/DL (ref 6–20)
CALCIUM SERPL-MCNC: 9.6 MG/DL (ref 8.7–10.5)
CHLORIDE SERPL-SCNC: 106 MMOL/L (ref 95–110)
CHOLEST SERPL-MCNC: 250 MG/DL (ref 120–199)
CHOLEST/HDLC SERPL: 5.7 {RATIO} (ref 2–5)
CO2 SERPL-SCNC: 20 MMOL/L (ref 23–29)
CREAT SERPL-MCNC: 1 MG/DL (ref 0.5–1.4)
EST. GFR  (NO RACE VARIABLE): >60 ML/MIN/1.73 M^2
GLUCOSE SERPL-MCNC: 90 MG/DL (ref 70–110)
HDLC SERPL-MCNC: 44 MG/DL (ref 40–75)
HDLC SERPL: 17.6 % (ref 20–50)
LDLC SERPL CALC-MCNC: 179.4 MG/DL (ref 63–159)
NONHDLC SERPL-MCNC: 206 MG/DL
POTASSIUM SERPL-SCNC: 4.1 MMOL/L (ref 3.5–5.1)
PROT SERPL-MCNC: 7.7 G/DL (ref 6–8.4)
SODIUM SERPL-SCNC: 138 MMOL/L (ref 136–145)
TRIGL SERPL-MCNC: 133 MG/DL (ref 30–150)
TSH SERPL DL<=0.005 MIU/L-ACNC: 2.23 UIU/ML (ref 0.34–5.6)

## 2023-10-25 PROCEDURE — 84443 ASSAY THYROID STIM HORMONE: CPT | Performed by: NURSE PRACTITIONER

## 2023-10-25 PROCEDURE — 83036 HEMOGLOBIN GLYCOSYLATED A1C: CPT | Performed by: NURSE PRACTITIONER

## 2023-10-25 PROCEDURE — 80061 LIPID PANEL: CPT | Performed by: NURSE PRACTITIONER

## 2023-10-25 PROCEDURE — 80053 COMPREHEN METABOLIC PANEL: CPT | Performed by: NURSE PRACTITIONER

## 2023-10-25 PROCEDURE — 36415 COLL VENOUS BLD VENIPUNCTURE: CPT | Performed by: NURSE PRACTITIONER

## 2023-10-26 LAB
ESTIMATED AVG GLUCOSE: 117 MG/DL (ref 68–131)
HBA1C MFR BLD: 5.7 % (ref 4.5–6.2)

## 2023-10-27 ENCOUNTER — TELEPHONE (OUTPATIENT)
Dept: FAMILY MEDICINE | Facility: CLINIC | Age: 33
End: 2023-10-27

## 2023-10-27 NOTE — TELEPHONE ENCOUNTER
----- Message from Harsh Iyer NP sent at 10/26/2023 11:08 AM CDT -----  Cholesterol remains elevated as does his liver enzymes.  Will discuss further at follow-up.

## 2023-11-01 ENCOUNTER — OFFICE VISIT (OUTPATIENT)
Dept: FAMILY MEDICINE | Facility: CLINIC | Age: 33
End: 2023-11-01
Payer: MEDICAID

## 2023-11-01 VITALS
DIASTOLIC BLOOD PRESSURE: 84 MMHG | WEIGHT: 267 LBS | OXYGEN SATURATION: 98 % | SYSTOLIC BLOOD PRESSURE: 122 MMHG | HEART RATE: 95 BPM | TEMPERATURE: 99 F | HEIGHT: 73 IN | BODY MASS INDEX: 35.39 KG/M2

## 2023-11-01 DIAGNOSIS — F90.9 ATTENTION DEFICIT HYPERACTIVITY DISORDER (ADHD), UNSPECIFIED ADHD TYPE: Primary | ICD-10-CM

## 2023-11-01 DIAGNOSIS — Z23 NEED FOR INFLUENZA VACCINATION: ICD-10-CM

## 2023-11-01 DIAGNOSIS — E78.01 FAMILIAL HYPERCHOLESTEREMIA: ICD-10-CM

## 2023-11-01 DIAGNOSIS — R20.0 NUMBNESS OF RIGHT ANTERIOR THIGH: ICD-10-CM

## 2023-11-01 DIAGNOSIS — K76.0 FATTY LIVER: ICD-10-CM

## 2023-11-01 DIAGNOSIS — R73.02 IGT (IMPAIRED GLUCOSE TOLERANCE): ICD-10-CM

## 2023-11-01 PROCEDURE — 3079F DIAST BP 80-89 MM HG: CPT | Mod: CPTII,,, | Performed by: NURSE PRACTITIONER

## 2023-11-01 PROCEDURE — 3079F PR MOST RECENT DIASTOLIC BLOOD PRESSURE 80-89 MM HG: ICD-10-PCS | Mod: CPTII,,, | Performed by: NURSE PRACTITIONER

## 2023-11-01 PROCEDURE — 3074F PR MOST RECENT SYSTOLIC BLOOD PRESSURE < 130 MM HG: ICD-10-PCS | Mod: CPTII,,, | Performed by: NURSE PRACTITIONER

## 2023-11-01 PROCEDURE — 3008F BODY MASS INDEX DOCD: CPT | Mod: CPTII,,, | Performed by: NURSE PRACTITIONER

## 2023-11-01 PROCEDURE — 1159F PR MEDICATION LIST DOCUMENTED IN MEDICAL RECORD: ICD-10-PCS | Mod: CPTII,,, | Performed by: NURSE PRACTITIONER

## 2023-11-01 PROCEDURE — 3008F PR BODY MASS INDEX (BMI) DOCUMENTED: ICD-10-PCS | Mod: CPTII,,, | Performed by: NURSE PRACTITIONER

## 2023-11-01 PROCEDURE — 1159F MED LIST DOCD IN RCRD: CPT | Mod: CPTII,,, | Performed by: NURSE PRACTITIONER

## 2023-11-01 PROCEDURE — 99999PBSHW FLU VACCINE (QUAD) GREATER THAN OR EQUAL TO 3YO PRESERVATIVE FREE IM: Mod: PBBFAC,,,

## 2023-11-01 PROCEDURE — 99214 OFFICE O/P EST MOD 30 MIN: CPT | Mod: S$PBB,,, | Performed by: NURSE PRACTITIONER

## 2023-11-01 PROCEDURE — 1160F PR REVIEW ALL MEDS BY PRESCRIBER/CLIN PHARMACIST DOCUMENTED: ICD-10-PCS | Mod: CPTII,,, | Performed by: NURSE PRACTITIONER

## 2023-11-01 PROCEDURE — 99213 OFFICE O/P EST LOW 20 MIN: CPT | Performed by: NURSE PRACTITIONER

## 2023-11-01 PROCEDURE — 99999PBSHW FLU VACCINE (QUAD) GREATER THAN OR EQUAL TO 3YO PRESERVATIVE FREE IM: ICD-10-PCS | Mod: PBBFAC,,,

## 2023-11-01 PROCEDURE — 1160F RVW MEDS BY RX/DR IN RCRD: CPT | Mod: CPTII,,, | Performed by: NURSE PRACTITIONER

## 2023-11-01 PROCEDURE — 3044F PR MOST RECENT HEMOGLOBIN A1C LEVEL <7.0%: ICD-10-PCS | Mod: CPTII,,, | Performed by: NURSE PRACTITIONER

## 2023-11-01 PROCEDURE — 3044F HG A1C LEVEL LT 7.0%: CPT | Mod: CPTII,,, | Performed by: NURSE PRACTITIONER

## 2023-11-01 PROCEDURE — 99214 PR OFFICE/OUTPT VISIT, EST, LEVL IV, 30-39 MIN: ICD-10-PCS | Mod: S$PBB,,, | Performed by: NURSE PRACTITIONER

## 2023-11-01 PROCEDURE — 3074F SYST BP LT 130 MM HG: CPT | Mod: CPTII,,, | Performed by: NURSE PRACTITIONER

## 2023-11-01 PROCEDURE — 90471 IMMUNIZATION ADMIN: CPT | Mod: PBBFAC | Performed by: NURSE PRACTITIONER

## 2023-11-01 RX ORDER — NAPROXEN 500 MG/1
500 TABLET ORAL 2 TIMES DAILY
Qty: 20 TABLET | Refills: 0 | Status: SHIPPED | OUTPATIENT
Start: 2023-11-01

## 2023-11-01 RX ORDER — GABAPENTIN 300 MG/1
300 CAPSULE ORAL NIGHTLY
Qty: 30 CAPSULE | Refills: 0 | Status: SHIPPED | OUTPATIENT
Start: 2023-11-01

## 2023-11-01 RX ORDER — DEXTROAMPHETAMINE SACCHARATE, AMPHETAMINE ASPARTATE MONOHYDRATE, DEXTROAMPHETAMINE SULFATE AND AMPHETAMINE SULFATE 5; 5; 5; 5 MG/1; MG/1; MG/1; MG/1
20 CAPSULE, EXTENDED RELEASE ORAL EVERY MORNING
Qty: 30 CAPSULE | Refills: 0 | Status: SHIPPED | OUTPATIENT
Start: 2023-11-01 | End: 2024-02-01 | Stop reason: SDUPTHER

## 2023-11-01 NOTE — PROGRESS NOTES
SUBJECTIVE:      Patient ID: Adam Nettles is a 33 y.o. male.    Chief Complaint: Follow-up and ADHD    33-year-old male presents to the clinic for ADHD follow-up.     He had labs completed, which were reviewed.  He has given a prescription for Adderall XR 20 mg in July, but never fill the prescription.   reviewed and patient has yet to  Addrall XR this year.     Cholesterol is elevated, but improved from 2 years ago.  He reports making diet modifications.  .4, HDL 44, triglycerides 133, and total cholesterol 250.  Liver enzymes are elevated AST 53 and ALT 65.  A1c 5.7%, consistent with prediabetes.    Prior imaging reviewed, fatty liver noted in 2020.     He reports right lateral thigh numbness x2 weeks. He denies wearing tight pants or belts, but has gained some weight, 3 lb since the last office visit. Denies back pain. Denies injury or trauma to area an rashes.          Family History   Problem Relation Age of Onset    Pancreatic cancer Mother     Cancer Mother     Stroke Father     No Known Problems Sister     No Known Problems Brother       Social History     Socioeconomic History    Marital status: Single    Number of children: 0   Occupational History    Occupation:    Tobacco Use    Smoking status: Never    Smokeless tobacco: Never   Substance and Sexual Activity    Alcohol use: Not Currently    Drug use: Never    Sexual activity: Not Currently     Social Determinants of Health     Stress: Stress Concern Present (4/29/2020)    [employer] - Occupational Stress Questionnaire     Feeling of Stress : Rather much     Current Outpatient Medications   Medication Sig Dispense Refill    betamethasone dipropionate 0.05 % cream Apply topically 2 (two) times daily. Apply directly on and around the phimotic ring. 45 g 1    cyclobenzaprine (FLEXERIL) 10 MG tablet Take 1 tablet (10 mg total) by mouth 3 (three) times daily as needed for Muscle spasms. 21  tablet 0    dextroamphetamine-amphetamine (ADDERALL XR) 20 MG 24 hr capsule Take 1 capsule (20 mg total) by mouth every morning. 30 capsule 0    gabapentin (NEURONTIN) 300 MG capsule Take 1 capsule (300 mg total) by mouth every evening. 30 capsule 0    naproxen (NAPROSYN) 500 MG tablet Take 1 tablet (500 mg total) by mouth 2 (two) times daily. 20 tablet 0     No current facility-administered medications for this visit.     Review of patient's allergies indicates:  No Known Allergies   Past Medical History:   Diagnosis Date    ADHD     ADHD (attention deficit hyperactivity disorder)     Insomnia 4/28/2021    Panic attacks 2015     History reviewed. No pertinent surgical history.    Review of Systems   Constitutional:  Negative for activity change, appetite change, chills, diaphoresis, fatigue, fever and unexpected weight change.   HENT:  Negative for congestion, ear pain, sinus pressure, sore throat, trouble swallowing and voice change.    Eyes:  Negative for pain, discharge and visual disturbance.   Respiratory:  Negative for cough, chest tightness, shortness of breath and wheezing.    Cardiovascular:  Negative for chest pain and palpitations.   Gastrointestinal:  Negative for abdominal pain, constipation, diarrhea, nausea and vomiting.   Genitourinary:  Negative for difficulty urinating, flank pain, frequency and urgency.   Musculoskeletal:  Negative for back pain and joint swelling.   Skin:  Negative for color change and rash.   Neurological:  Positive for numbness (right anterior thigh). Negative for dizziness, seizures, syncope, weakness and headaches.   Hematological:  Negative for adenopathy.   Psychiatric/Behavioral:  Negative for dysphoric mood and sleep disturbance. The patient is not nervous/anxious.       OBJECTIVE:      Vitals:    11/01/23 1514   BP: 122/84   BP Location: Left arm   Patient Position: Sitting   BP Method: Medium (Manual)   Pulse: 95   Temp: 98.6 °F (37 °C)   TempSrc: Oral   SpO2: 98%  "  Weight: 121.1 kg (267 lb)   Height: 6' 1" (1.854 m)     Physical Exam  Vitals and nursing note reviewed.   Constitutional:       General: He is awake. He is not in acute distress.     Appearance: Normal appearance. He is obese. He is not ill-appearing, toxic-appearing or diaphoretic.   HENT:      Head: Normocephalic and atraumatic.      Nose: Nose normal.   Eyes:      General: Lids are normal. Gaze aligned appropriately.      Conjunctiva/sclera: Conjunctivae normal.      Right eye: Right conjunctiva is not injected.      Left eye: Left conjunctiva is not injected.      Pupils: Pupils are equal, round, and reactive to light.   Cardiovascular:      Rate and Rhythm: Normal rate and regular rhythm.      Pulses: Normal pulses.      Heart sounds: Normal heart sounds, S1 normal and S2 normal. No murmur heard.     No friction rub. No gallop.   Pulmonary:      Effort: Pulmonary effort is normal. No respiratory distress.      Breath sounds: Normal breath sounds. No stridor. No decreased breath sounds, wheezing, rhonchi or rales.   Chest:      Chest wall: No tenderness.   Musculoskeletal:      Cervical back: Neck supple.      Lumbar back: No tenderness.      Right hip: No tenderness or bony tenderness. Normal range of motion.      Left hip: No tenderness or bony tenderness. Normal range of motion.      Right lower leg: No edema.      Left lower leg: No edema.   Lymphadenopathy:      Cervical: No cervical adenopathy.   Skin:     General: Skin is warm and dry.      Capillary Refill: Capillary refill takes less than 2 seconds.      Findings: No erythema or rash.   Neurological:      Mental Status: He is alert and oriented to person, place, and time. Mental status is at baseline.   Psychiatric:         Attention and Perception: Attention normal.         Mood and Affect: Mood normal.         Speech: Speech normal.         Behavior: Behavior normal. Behavior is cooperative.         Thought Content: Thought content normal.         " Judgment: Judgment normal.        Lab Visit on 10/25/2023   Component Date Value Ref Range Status    Sodium 10/25/2023 138  136 - 145 mmol/L Final    Potassium 10/25/2023 4.1  3.5 - 5.1 mmol/L Final    Chloride 10/25/2023 106  95 - 110 mmol/L Final    CO2 10/25/2023 20 (L)  23 - 29 mmol/L Final    Glucose 10/25/2023 90  70 - 110 mg/dL Final    BUN 10/25/2023 13  6 - 20 mg/dL Final    Creatinine 10/25/2023 1.0  0.5 - 1.4 mg/dL Final    Calcium 10/25/2023 9.6  8.7 - 10.5 mg/dL Final    Total Protein 10/25/2023 7.7  6.0 - 8.4 g/dL Final    Albumin 10/25/2023 4.5  3.5 - 5.2 g/dL Final    Total Bilirubin 10/25/2023 0.4  0.1 - 1.0 mg/dL Final    Comment: For infants and newborns, interpretation of results should be based  on gestational age, weight and in agreement with clinical  observations.    Premature Infant recommended reference ranges:  Up to 24 hours.............<8.0 mg/dL  Up to 48 hours............<12.0 mg/dL  3-5 days..................<15.0 mg/dL  6-29 days.................<15.0 mg/dL      Alkaline Phosphatase 10/25/2023 104  55 - 135 U/L Final    AST 10/25/2023 53 (H)  10 - 40 U/L Final    ALT 10/25/2023 65 (H)  10 - 44 U/L Final    eGFR 10/25/2023 >60.0  >60 mL/min/1.73 m^2 Final    Anion Gap 10/25/2023 12  8 - 16 mmol/L Final    Cholesterol 10/25/2023 250 (H)  120 - 199 mg/dL Final    Comment: The National Cholesterol Education Program (NCEP) has set the  following guidelines (reference ranges) for Cholesterol:  Optimal.....................<200 mg/dL  Borderline High.............200-239 mg/dL  High........................> or = 240 mg/dL      Triglycerides 10/25/2023 133  30 - 150 mg/dL Final    Comment: The National Cholesterol Education Program (NCEP) has set the  following guidelines (reference values) for triglycerides:  Normal......................<150 mg/dL  Borderline High.............150-199 mg/dL  High........................200-499 mg/dL      HDL 10/25/2023 44  40 - 75 mg/dL Final    Comment: The  National Cholesterol Education Program (NCEP) has set the  following guidelines (reference values) for HDL Cholesterol:  Low...............<40 mg/dL  Optimal...........>60 mg/dL      LDL Cholesterol 10/25/2023 179.4 (H)  63.0 - 159.0 mg/dL Final    Comment: The National Cholesterol Education Program (NCEP) has set the  following guidelines (reference values) for LDL Cholesterol:  Optimal.......................<130 mg/dL  Borderline High...............130-159 mg/dL  High..........................160-189 mg/dL  Very High.....................>190 mg/dL      HDL/Cholesterol Ratio 10/25/2023 17.6 (L)  20.0 - 50.0 % Final    Total Cholesterol/HDL Ratio 10/25/2023 5.7 (H)  2.0 - 5.0 Final    Non-HDL Cholesterol 10/25/2023 206  mg/dL Final    Comment: Risk category and Non-HDL cholesterol goals:  Coronary heart disease (CHD)or equivalent (10-year risk of CHD >20%):  Non-HDL cholesterol goal     <130 mg/dL  Two or more CHD risk factors and 10-year risk of CHD <= 20%:  Non-HDL cholesterol goal     <160 mg/dL  0 to 1 CHD risk factor:  Non-HDL cholesterol goal     <190 mg/dL      TSH 10/25/2023 2.231  0.340 - 5.600 uIU/mL Final    Hemoglobin A1C 10/25/2023 5.7  4.5 - 6.2 % Final    Comment: According to ADA guidelines, hemoglobin A1C <7.0% represents  optimal control in non-pregnant diabetic patients.  Different  metrics may apply to specific populations.   Standards of Medical Care in Diabetes - 2016.    For the purpose of screening for the presence of diabetes:  <5.7%     Consistent with the absence of diabetes  5.7-6.4%  Consistent with increasing risk for diabetes   (prediabetes)  >or=6.5%  Consistent with diabetes    Currently no consensus exists for use of hemoglobin A1C  for diagnosis of diabetes for children.      Estimated Avg Glucose 10/25/2023 117  68 - 131 mg/dL Final     Assessment:       1. Attention deficit hyperactivity disorder (ADHD), unspecified ADHD type    2. Familial hypercholesteremia    3. Fatty liver     4. IGT (impaired glucose tolerance)    5. Numbness of right anterior thigh    6. Need for influenza vaccination        Plan:       Attention deficit hyperactivity disorder (ADHD), unspecified ADHD type  Medication refilled.  from pharmacy.   -     dextroamphetamine-amphetamine (ADDERALL XR) 20 MG 24 hr capsule; Take 1 capsule (20 mg total) by mouth every morning.  Dispense: 30 capsule; Refill: 0    Familial hypercholesteremia  Limit red meat, butter, fried foods, cheese, and other foods that have a lot of saturated fat. Consume more: lean meats, fish, fruits, vegetables, whole grains, beans, lentils, and nuts.  Weight loss, and 30-45 min of cardiovascular exercise daily.    Fatty liver  Recommend diet, exercise, weight loss.  Low fat/low chol diet.  Limit sugar intake as well.     IGT (impaired glucose tolerance)  Cut down on the starches, carbohydrates, sugars and high-calorie items like syrups, sweets, cookies, and candies.    Numbness of right anterior thigh  Suspect meralgia paresthetica.  Will treat with gabapentin and naproxen.  Avoid sight fitting clothing and belts around the waist. Lose weight.    -     gabapentin (NEURONTIN) 300 MG capsule; Take 1 capsule (300 mg total) by mouth every evening.  Dispense: 30 capsule; Refill: 0  -     naproxen (NAPROSYN) 500 MG tablet; Take 1 tablet (500 mg total) by mouth 2 (two) times daily.  Dispense: 20 tablet; Refill: 0    Need for influenza vaccination  -     Influenza - Quadrivalent (PF)    This note was created using [employer] voice recognition software that occasionally misinterprets phrases or words.     I spent a total of 25 minutes on the day of the visit.This includes face to face time and non-face to face time preparing to see the patient (eg, review of tests), obtaining and/or reviewing separately obtained history, documenting clinical information in the electronic or other health record, independently interpreting results and communicating results to  the patient/family/caregiver, or care coordinator.    Follow up in about 3 months (around 2/1/2024) for ADHD.      11/1/2023 KATIUSKA Ramires, FNP

## 2024-02-01 ENCOUNTER — OFFICE VISIT (OUTPATIENT)
Dept: FAMILY MEDICINE | Facility: CLINIC | Age: 34
End: 2024-02-01
Payer: MEDICAID

## 2024-02-01 VITALS
TEMPERATURE: 98 F | DIASTOLIC BLOOD PRESSURE: 76 MMHG | OXYGEN SATURATION: 98 % | HEIGHT: 73 IN | SYSTOLIC BLOOD PRESSURE: 116 MMHG | HEART RATE: 96 BPM | BODY MASS INDEX: 34.46 KG/M2 | WEIGHT: 260 LBS

## 2024-02-01 DIAGNOSIS — F90.9 ATTENTION DEFICIT HYPERACTIVITY DISORDER (ADHD), UNSPECIFIED ADHD TYPE: ICD-10-CM

## 2024-02-01 PROCEDURE — 3078F DIAST BP <80 MM HG: CPT | Mod: CPTII,,, | Performed by: NURSE PRACTITIONER

## 2024-02-01 PROCEDURE — 1159F MED LIST DOCD IN RCRD: CPT | Mod: CPTII,,, | Performed by: NURSE PRACTITIONER

## 2024-02-01 PROCEDURE — 99999 PR PBB SHADOW E&M-EST. PATIENT-LVL IV: CPT | Mod: PBBFAC,,, | Performed by: NURSE PRACTITIONER

## 2024-02-01 PROCEDURE — 99213 OFFICE O/P EST LOW 20 MIN: CPT | Mod: S$PBB,,, | Performed by: NURSE PRACTITIONER

## 2024-02-01 PROCEDURE — 3074F SYST BP LT 130 MM HG: CPT | Mod: CPTII,,, | Performed by: NURSE PRACTITIONER

## 2024-02-01 PROCEDURE — 99214 OFFICE O/P EST MOD 30 MIN: CPT | Mod: PBBFAC,PN | Performed by: NURSE PRACTITIONER

## 2024-02-01 PROCEDURE — 3008F BODY MASS INDEX DOCD: CPT | Mod: CPTII,,, | Performed by: NURSE PRACTITIONER

## 2024-02-01 PROCEDURE — 1160F RVW MEDS BY RX/DR IN RCRD: CPT | Mod: CPTII,,, | Performed by: NURSE PRACTITIONER

## 2024-02-01 RX ORDER — DEXTROAMPHETAMINE SACCHARATE, AMPHETAMINE ASPARTATE MONOHYDRATE, DEXTROAMPHETAMINE SULFATE AND AMPHETAMINE SULFATE 5; 5; 5; 5 MG/1; MG/1; MG/1; MG/1
20 CAPSULE, EXTENDED RELEASE ORAL EVERY MORNING
Qty: 30 CAPSULE | Refills: 0 | Status: SHIPPED | OUTPATIENT
Start: 2024-02-01 | End: 2024-05-01 | Stop reason: SDUPTHER

## 2024-02-01 NOTE — PROGRESS NOTES
SUBJECTIVE:      Patient ID: Adam Nettles is a 34 y.o. male.    Chief Complaint: Follow-up    34-year-old male presents to the clinic for ADHD follow-up. Currently on Adderall XR 20 mg daily. He does well at the current dose, but has not filled the medication in a while. Only takes Adderall on the days he works, only working 2 days per week at this time. He is sleeping well.  Mood is stable. Denies chest pain and palpitations.        Family History   Problem Relation Age of Onset    Pancreatic cancer Mother     Cancer Mother     Stroke Father     No Known Problems Sister     No Known Problems Brother       Social History     Socioeconomic History    Marital status: Single    Number of children: 0   Occupational History    Occupation: Security Guard   Tobacco Use    Smoking status: Never    Smokeless tobacco: Never   Substance and Sexual Activity    Alcohol use: Not Currently    Drug use: Never    Sexual activity: Not Currently     Social Determinants of Health     Stress: Stress Concern Present (4/29/2020)    [employer] [place] [employer] - Occupational Stress Questionnaire     Feeling of Stress : Rather much     Current Outpatient Medications   Medication Sig Dispense Refill    betamethasone dipropionate 0.05 % cream Apply topically 2 (two) times daily. Apply directly on and around the phimotic ring. 45 g 1    cyclobenzaprine (FLEXERIL) 10 MG tablet Take 1 tablet (10 mg total) by mouth 3 (three) times daily as needed for Muscle spasms. 21 tablet 0    gabapentin (NEURONTIN) 300 MG capsule Take 1 capsule (300 mg total) by mouth every evening. 30 capsule 0    naproxen (NAPROSYN) 500 MG tablet Take 1 tablet (500 mg total) by mouth 2 (two) times daily. 20 tablet 0    dextroamphetamine-amphetamine (ADDERALL XR) 20 MG 24 hr capsule Take 1 capsule (20 mg total) by mouth every morning. 30 capsule 0     No current facility-administered medications for this visit.     Review of patient's allergies  "indicates:  No Known Allergies   Past Medical History:   Diagnosis Date    ADHD     ADHD (attention deficit hyperactivity disorder)     Insomnia 4/28/2021    Panic attacks 2015     History reviewed. No pertinent surgical history.    Review of Systems   Constitutional:  Negative for activity change, appetite change, chills, diaphoresis, fatigue, fever and unexpected weight change.   HENT:  Negative for congestion, ear pain, sinus pressure, sore throat, trouble swallowing and voice change.    Eyes:  Negative for pain, discharge and visual disturbance.   Respiratory:  Negative for cough, chest tightness, shortness of breath and wheezing.    Cardiovascular:  Negative for chest pain and palpitations.   Gastrointestinal:  Negative for abdominal pain, constipation, diarrhea, nausea and vomiting.   Genitourinary:  Negative for difficulty urinating, flank pain, frequency and urgency.   Musculoskeletal:  Negative for back pain and joint swelling.   Skin:  Negative for color change and rash.   Neurological:  Negative for dizziness, seizures, syncope, weakness, numbness and headaches.   Hematological:  Negative for adenopathy.   Psychiatric/Behavioral:  Negative for dysphoric mood and sleep disturbance. The patient is not nervous/anxious.       OBJECTIVE:      Vitals:    02/01/24 1601   BP: 116/76   BP Location: Left arm   Patient Position: Sitting   BP Method: Medium (Manual)   Pulse: 96   Temp: 97.8 °F (36.6 °C)   TempSrc: Oral   SpO2: 98%   Weight: 117.9 kg (260 lb)   Height: 6' 1" (1.854 m)     Physical Exam  Vitals and nursing note reviewed.   Constitutional:       General: He is awake. He is not in acute distress.     Appearance: Normal appearance. He is obese. He is not ill-appearing, toxic-appearing or diaphoretic.   HENT:      Head: Normocephalic and atraumatic.      Nose: Nose normal.   Eyes:      General: Lids are normal. Gaze aligned appropriately.      Conjunctiva/sclera: Conjunctivae normal.      Right eye: Right " conjunctiva is not injected.      Left eye: Left conjunctiva is not injected.      Pupils: Pupils are equal, round, and reactive to light.   Cardiovascular:      Rate and Rhythm: Normal rate and regular rhythm.      Pulses: Normal pulses.      Heart sounds: Normal heart sounds, S1 normal and S2 normal. No murmur heard.     No friction rub. No gallop.   Pulmonary:      Effort: Pulmonary effort is normal. No respiratory distress.      Breath sounds: Normal breath sounds. No stridor. No decreased breath sounds, wheezing, rhonchi or rales.   Chest:      Chest wall: No tenderness.   Musculoskeletal:      Cervical back: Neck supple.      Right lower leg: No edema.      Left lower leg: No edema.   Lymphadenopathy:      Cervical: No cervical adenopathy.   Skin:     General: Skin is warm and dry.      Capillary Refill: Capillary refill takes less than 2 seconds.      Findings: No erythema or rash.   Neurological:      Mental Status: He is alert and oriented to person, place, and time. Mental status is at baseline.   Psychiatric:         Attention and Perception: Attention normal.         Mood and Affect: Mood normal.         Speech: Speech normal.         Behavior: Behavior normal. Behavior is cooperative.         Thought Content: Thought content normal.         Judgment: Judgment normal.        Assessment:       1. Attention deficit hyperactivity disorder (ADHD), unspecified ADHD type        Plan:       Attention deficit hyperactivity disorder (ADHD), unspecified ADHD type  Stable, continue Adderall XR 20 mg.  reviewed.  Med refilled.   -     dextroamphetamine-amphetamine (ADDERALL XR) 20 MG 24 hr capsule; Take 1 capsule (20 mg total) by mouth every morning.  Dispense: 30 capsule; Refill: 0    This note was created using [employer] voice recognition software that occasionally misinterprets phrases or words.     I spent a total of 15 minutes on the day of the visit.This includes face to face time and non-face to face time  preparing to see the patient (eg, review of tests), obtaining and/or reviewing separately obtained history, documenting clinical information in the electronic or other health record, independently interpreting results and communicating results to the patient/family/caregiver, or care coordinator.    Follow up in about 3 months (around 5/1/2024) for ADHD.          2/1/2024 KATIUSKA Ramires, FNP

## 2024-05-01 ENCOUNTER — OFFICE VISIT (OUTPATIENT)
Dept: FAMILY MEDICINE | Facility: CLINIC | Age: 34
End: 2024-05-01
Payer: MEDICAID

## 2024-05-01 VITALS
BODY MASS INDEX: 34.85 KG/M2 | SYSTOLIC BLOOD PRESSURE: 122 MMHG | OXYGEN SATURATION: 97 % | WEIGHT: 263 LBS | HEIGHT: 73 IN | DIASTOLIC BLOOD PRESSURE: 86 MMHG | HEART RATE: 84 BPM

## 2024-05-01 DIAGNOSIS — F90.9 ATTENTION DEFICIT HYPERACTIVITY DISORDER (ADHD), UNSPECIFIED ADHD TYPE: ICD-10-CM

## 2024-05-01 PROCEDURE — 3079F DIAST BP 80-89 MM HG: CPT | Mod: CPTII,,, | Performed by: NURSE PRACTITIONER

## 2024-05-01 PROCEDURE — 1160F RVW MEDS BY RX/DR IN RCRD: CPT | Mod: CPTII,,, | Performed by: NURSE PRACTITIONER

## 2024-05-01 PROCEDURE — 99213 OFFICE O/P EST LOW 20 MIN: CPT | Mod: S$PBB,,, | Performed by: NURSE PRACTITIONER

## 2024-05-01 PROCEDURE — 3008F BODY MASS INDEX DOCD: CPT | Mod: CPTII,,, | Performed by: NURSE PRACTITIONER

## 2024-05-01 PROCEDURE — 3074F SYST BP LT 130 MM HG: CPT | Mod: CPTII,,, | Performed by: NURSE PRACTITIONER

## 2024-05-01 PROCEDURE — 99999 PR PBB SHADOW E&M-EST. PATIENT-LVL III: CPT | Mod: PBBFAC,,, | Performed by: NURSE PRACTITIONER

## 2024-05-01 PROCEDURE — 1159F MED LIST DOCD IN RCRD: CPT | Mod: CPTII,,, | Performed by: NURSE PRACTITIONER

## 2024-05-01 PROCEDURE — 99213 OFFICE O/P EST LOW 20 MIN: CPT | Mod: PBBFAC,PN | Performed by: NURSE PRACTITIONER

## 2024-05-01 RX ORDER — DEXTROAMPHETAMINE SACCHARATE, AMPHETAMINE ASPARTATE MONOHYDRATE, DEXTROAMPHETAMINE SULFATE AND AMPHETAMINE SULFATE 5; 5; 5; 5 MG/1; MG/1; MG/1; MG/1
20 CAPSULE, EXTENDED RELEASE ORAL EVERY MORNING
Qty: 30 CAPSULE | Refills: 0 | Status: SHIPPED | OUTPATIENT
Start: 2024-05-01

## 2024-05-01 NOTE — PROGRESS NOTES
SUBJECTIVE:      Patient ID: Adam Nettles is a 34 y.o. male.    Chief Complaint: Follow-up    34-year-old male presents to the clinic for ADHD follow-up. Currently on Adderall XR 20 mg daily. He does well at the current dose, but has not filled the medication in a while. Only takes Adderall on the days he works, only works 2-3 days per week. He is sleeping well.  Mood is stable and denies depression. Blood pressure is controlled. Denies chest pain and palpitations.        Family History   Problem Relation Name Age of Onset    Pancreatic cancer Mother      Cancer Mother      Stroke Father      No Known Problems Sister      No Known Problems Brother        Social History     Socioeconomic History    Marital status: Single    Number of children: 0   Occupational History    Occupation: Security Guard   Tobacco Use    Smoking status: Never    Smokeless tobacco: Never   Substance and Sexual Activity    Alcohol use: Not Currently    Drug use: Never    Sexual activity: Not Currently     Social Determinants of Health     Stress: Stress Concern Present (4/29/2020)    [employer] [place] [employer] - Occupational Stress Questionnaire     Feeling of Stress : Rather much     Current Outpatient Medications   Medication Sig Dispense Refill    betamethasone dipropionate 0.05 % cream Apply topically 2 (two) times daily. Apply directly on and around the phimotic ring. 45 g 1    cyclobenzaprine (FLEXERIL) 10 MG tablet Take 1 tablet (10 mg total) by mouth 3 (three) times daily as needed for Muscle spasms. 21 tablet 0    gabapentin (NEURONTIN) 300 MG capsule Take 1 capsule (300 mg total) by mouth every evening. 30 capsule 0    naproxen (NAPROSYN) 500 MG tablet Take 1 tablet (500 mg total) by mouth 2 (two) times daily. 20 tablet 0    dextroamphetamine-amphetamine (ADDERALL XR) 20 MG 24 hr capsule Take 1 capsule (20 mg total) by mouth every morning. 30 capsule 0     No current facility-administered medications for this  "visit.     Review of patient's allergies indicates:  No Known Allergies   Past Medical History:   Diagnosis Date    ADHD     ADHD (attention deficit hyperactivity disorder)     Insomnia 4/28/2021    Panic attacks 2015     No past surgical history on file.    Review of Systems   Constitutional:  Negative for activity change, appetite change, chills, diaphoresis, fatigue, fever and unexpected weight change.   HENT:  Negative for congestion, ear pain, sinus pressure, sore throat, trouble swallowing and voice change.    Eyes:  Negative for pain, discharge and visual disturbance.   Respiratory:  Negative for cough, chest tightness, shortness of breath and wheezing.    Cardiovascular:  Negative for chest pain and palpitations.   Gastrointestinal:  Negative for abdominal pain, constipation, diarrhea, nausea and vomiting.   Genitourinary:  Negative for difficulty urinating, flank pain, frequency and urgency.   Musculoskeletal:  Negative for back pain and joint swelling.   Skin:  Negative for color change and rash.   Neurological:  Negative for dizziness, seizures, syncope, weakness, numbness and headaches.   Hematological:  Negative for adenopathy.   Psychiatric/Behavioral:  Positive for decreased concentration. Negative for dysphoric mood and sleep disturbance. The patient is not nervous/anxious.       OBJECTIVE:      Vitals:    05/01/24 1532   BP: 122/86   BP Location: Left arm   Patient Position: Sitting   BP Method: Medium (Manual)   Pulse: 84   SpO2: 97%   Weight: 119.3 kg (263 lb)   Height: 6' 1" (1.854 m)     Physical Exam  Vitals and nursing note reviewed.   Constitutional:       General: He is awake. He is not in acute distress.     Appearance: Normal appearance. He is obese. He is not ill-appearing, toxic-appearing or diaphoretic.   HENT:      Head: Normocephalic and atraumatic.      Nose: Nose normal.   Eyes:      General: Lids are normal. Gaze aligned appropriately.      Conjunctiva/sclera: Conjunctivae normal. "      Right eye: Right conjunctiva is not injected.      Left eye: Left conjunctiva is not injected.      Pupils: Pupils are equal, round, and reactive to light.   Cardiovascular:      Rate and Rhythm: Normal rate and regular rhythm.      Pulses: Normal pulses.      Heart sounds: Normal heart sounds, S1 normal and S2 normal. No murmur heard.     No friction rub. No gallop.   Pulmonary:      Effort: Pulmonary effort is normal. No respiratory distress.      Breath sounds: Normal breath sounds. No stridor. No decreased breath sounds, wheezing, rhonchi or rales.   Chest:      Chest wall: No tenderness.   Musculoskeletal:      Cervical back: Neck supple.      Right lower leg: No edema.      Left lower leg: No edema.   Lymphadenopathy:      Cervical: No cervical adenopathy.   Skin:     General: Skin is warm and dry.      Capillary Refill: Capillary refill takes less than 2 seconds.      Findings: No erythema or rash.   Neurological:      Mental Status: He is alert and oriented to person, place, and time. Mental status is at baseline.   Psychiatric:         Attention and Perception: Attention normal.         Mood and Affect: Mood normal.         Speech: Speech normal.         Behavior: Behavior normal. Behavior is cooperative.         Thought Content: Thought content normal.         Judgment: Judgment normal.        Assessment:       1. Attention deficit hyperactivity disorder (ADHD), unspecified ADHD type        Plan:       Attention deficit hyperactivity disorder (ADHD), unspecified ADHD type  Stable, continue current treatment.  reviewed. Follow-up in 3 months.  -     dextroamphetamine-amphetamine (ADDERALL XR) 20 MG 24 hr capsule; Take 1 capsule (20 mg total) by mouth every morning.  Dispense: 30 capsule; Refill: 0    This note was created using [employer] voice recognition software that occasionally misinterprets phrases or words.     I spent a total of 10 minutes on the day of the visit.This includes face to face time  and non-face to face time preparing to see the patient (eg, review of tests), obtaining and/or reviewing separately obtained history, documenting clinical information in the electronic or other health record, independently interpreting results and communicating results to the patient/family/caregiver, or care coordinator.    Follow up in about 3 months (around 8/1/2024) for ADHD.          5/1/2024 KATISUKA Ramires, SCOTTYP

## 2024-08-01 DIAGNOSIS — F90.9 ATTENTION DEFICIT HYPERACTIVITY DISORDER (ADHD), UNSPECIFIED ADHD TYPE: ICD-10-CM

## 2024-08-01 RX ORDER — DEXTROAMPHETAMINE SACCHARATE, AMPHETAMINE ASPARTATE MONOHYDRATE, DEXTROAMPHETAMINE SULFATE AND AMPHETAMINE SULFATE 5; 5; 5; 5 MG/1; MG/1; MG/1; MG/1
20 CAPSULE, EXTENDED RELEASE ORAL EVERY MORNING
Qty: 30 CAPSULE | Refills: 0 | Status: SHIPPED | OUTPATIENT
Start: 2024-08-01